# Patient Record
Sex: MALE | Race: WHITE | Employment: OTHER | URBAN - METROPOLITAN AREA
[De-identification: names, ages, dates, MRNs, and addresses within clinical notes are randomized per-mention and may not be internally consistent; named-entity substitution may affect disease eponyms.]

---

## 2018-03-11 DIAGNOSIS — I48.91 ATRIAL FIBRILLATION, UNSPECIFIED TYPE (HCC): ICD-10-CM

## 2018-04-11 ENCOUNTER — OFFICE VISIT (OUTPATIENT)
Dept: CARDIOLOGY CLINIC | Age: 63
End: 2018-04-11
Payer: MEDICARE

## 2018-04-11 VITALS
HEART RATE: 63 BPM | RESPIRATION RATE: 16 BRPM | BODY MASS INDEX: 33.21 KG/M2 | SYSTOLIC BLOOD PRESSURE: 118 MMHG | DIASTOLIC BLOOD PRESSURE: 80 MMHG | HEIGHT: 76 IN | WEIGHT: 272.7 LBS

## 2018-04-11 DIAGNOSIS — I48.91 ATRIAL FIBRILLATION, UNSPECIFIED TYPE (HCC): Primary | ICD-10-CM

## 2018-04-11 PROCEDURE — 1036F TOBACCO NON-USER: CPT | Performed by: INTERNAL MEDICINE

## 2018-04-11 PROCEDURE — 93000 ELECTROCARDIOGRAM COMPLETE: CPT | Performed by: INTERNAL MEDICINE

## 2018-04-11 PROCEDURE — 99214 OFFICE O/P EST MOD 30 MIN: CPT | Performed by: INTERNAL MEDICINE

## 2018-04-11 PROCEDURE — 3017F COLORECTAL CA SCREEN DOC REV: CPT | Performed by: INTERNAL MEDICINE

## 2018-04-11 PROCEDURE — G8598 ASA/ANTIPLAT THER USED: HCPCS | Performed by: INTERNAL MEDICINE

## 2018-04-11 PROCEDURE — G8427 DOCREV CUR MEDS BY ELIG CLIN: HCPCS | Performed by: INTERNAL MEDICINE

## 2018-04-11 PROCEDURE — G8417 CALC BMI ABV UP PARAM F/U: HCPCS | Performed by: INTERNAL MEDICINE

## 2018-05-18 LAB — INR BLD: 2.5

## 2018-05-21 ENCOUNTER — HOSPITAL ENCOUNTER (OUTPATIENT)
Dept: PHARMACY | Age: 63
Setting detail: THERAPIES SERIES
Discharge: HOME | End: 2018-05-21
Payer: MEDICARE

## 2018-05-21 DIAGNOSIS — I48.91 ATRIAL FIBRILLATION, UNSPECIFIED TYPE (HCC): ICD-10-CM

## 2018-06-18 ENCOUNTER — HOSPITAL ENCOUNTER (OUTPATIENT)
Dept: PHARMACY | Age: 63
Setting detail: THERAPIES SERIES
Discharge: HOME | End: 2018-06-18
Payer: MEDICARE

## 2018-06-18 VITALS
BODY MASS INDEX: 33.33 KG/M2 | WEIGHT: 273.8 LBS | DIASTOLIC BLOOD PRESSURE: 81 MMHG | HEART RATE: 63 BPM | SYSTOLIC BLOOD PRESSURE: 152 MMHG

## 2018-06-18 DIAGNOSIS — I48.91 ATRIAL FIBRILLATION, UNSPECIFIED TYPE (HCC): ICD-10-CM

## 2018-06-18 LAB — INTERNATIONAL NORMALIZATION RATIO, POC: 2.2

## 2018-06-18 PROCEDURE — 99211 OFF/OP EST MAY X REQ PHY/QHP: CPT

## 2018-06-18 PROCEDURE — 85610 PROTHROMBIN TIME: CPT

## 2018-06-18 RX ORDER — ERGOCALCIFEROL 1.25 MG/1
50000 CAPSULE ORAL WEEKLY
COMMUNITY
End: 2020-01-22

## 2018-07-16 ENCOUNTER — HOSPITAL ENCOUNTER (OUTPATIENT)
Dept: PHARMACY | Age: 63
Setting detail: THERAPIES SERIES
Discharge: HOME | End: 2018-07-16
Payer: MEDICARE

## 2018-07-16 DIAGNOSIS — I48.91 ATRIAL FIBRILLATION, UNSPECIFIED TYPE (HCC): ICD-10-CM

## 2018-07-16 LAB — INR BLD: 2.8

## 2018-08-01 ENCOUNTER — TELEPHONE (OUTPATIENT)
Dept: PHARMACY | Age: 63
End: 2018-08-01

## 2018-08-13 ENCOUNTER — HOSPITAL ENCOUNTER (OUTPATIENT)
Dept: PHARMACY | Age: 63
Setting detail: THERAPIES SERIES
Discharge: HOME | End: 2018-08-13
Payer: MEDICARE

## 2018-08-13 DIAGNOSIS — I48.91 ATRIAL FIBRILLATION, UNSPECIFIED TYPE (HCC): ICD-10-CM

## 2018-08-13 LAB — INR BLD: 2.7

## 2018-09-10 ENCOUNTER — HOSPITAL ENCOUNTER (OUTPATIENT)
Dept: PHARMACY | Age: 63
Setting detail: THERAPIES SERIES
Discharge: HOME | End: 2018-09-10
Payer: MEDICARE

## 2018-09-10 DIAGNOSIS — I48.91 ATRIAL FIBRILLATION, UNSPECIFIED TYPE (HCC): ICD-10-CM

## 2018-09-10 LAB — INR BLD: 3.3

## 2018-09-10 NOTE — PROGRESS NOTES
[address] [facility] Anticoagulation Clinic - SELF-TEST Encounter    Patient Findings     Positives:   Change in medications (OFF INVOKANA; NOW ON JARDIANCE)    Negatives:   Signs/symptoms of thrombosis, Signs/symptoms of bleeding, Laboratory test error suspected, Change in health, Change in alcohol use, Change in activity, Upcoming invasive procedure, Emergency department visit, Upcoming dental procedure, Missed doses, Extra doses, Change in diet/appetite, Hospital admission, Bruising, Other complaints         Patient  reports that he has never smoked. He has never used smokeless tobacco.     Assessment/Plan:  INR is SUPRAtherapeutic at 3.3, goal is 2-3. NO KNOWN CAUSE. Warfarin Dose: 5 MG TODAY THEN RESUME USUAL DOSE OF 15 MG ON 15 MG ON MON./WED. ; 10 MG ALL OTHER DAYS    Warfarin Dosing Orders Provided To: Patient    Self-Test Follow Up: 2 weeks on 9/24    [facility] Scheduled Visit: Due Dec. 2018 - not yet scheduled    Patient understands dosing directions and information discussed. Dosing schedule and follow up appointment given to patient. Patient acknowledges working in consult agreement with pharmacist as referred by his/her physician.     Andrew GarciaD 9/10/2018 1:39 PM

## 2018-09-24 ENCOUNTER — HOSPITAL ENCOUNTER (OUTPATIENT)
Dept: PHARMACY | Age: 63
Setting detail: THERAPIES SERIES
Discharge: HOME | End: 2018-09-24
Payer: MEDICARE

## 2018-09-24 DIAGNOSIS — I48.91 ATRIAL FIBRILLATION, UNSPECIFIED TYPE (HCC): ICD-10-CM

## 2018-09-24 LAB — INR BLD: 2.5

## 2018-10-22 ENCOUNTER — HOSPITAL ENCOUNTER (OUTPATIENT)
Dept: PHARMACY | Age: 63
Setting detail: THERAPIES SERIES
Discharge: HOME | End: 2018-10-22
Payer: MEDICARE

## 2018-10-22 DIAGNOSIS — I48.91 ATRIAL FIBRILLATION, UNSPECIFIED TYPE (HCC): ICD-10-CM

## 2018-10-22 LAB — INR BLD: 2.5

## 2018-11-20 ENCOUNTER — HOSPITAL ENCOUNTER (OUTPATIENT)
Dept: PHARMACY | Age: 63
Setting detail: THERAPIES SERIES
Discharge: HOME | End: 2018-11-20
Payer: MEDICARE

## 2018-11-20 DIAGNOSIS — I48.91 ATRIAL FIBRILLATION, UNSPECIFIED TYPE (HCC): ICD-10-CM

## 2018-11-20 LAB — INR BLD: 4.1

## 2018-11-20 NOTE — PROGRESS NOTES
[facility] - SELF-TEST Encounter    Patient Findings     Negatives:   Signs/symptoms of bleeding, Change in health, Change in alcohol use, Upcoming invasive procedure, Emergency department visit, Upcoming dental procedure, Missed doses, Extra doses, Change in medications, Change in diet/appetite, Hospital admission, Bruising         Patient  reports that he has never smoked. He has never used smokeless tobacco.     Assessment/Plan:  INR is SUPRAtherapeutic at 4.1, goal is 2-3. UNKNOWN CAUSE. Patient is on warfarin for atrial fibrillation      Warfarin Dose: HOLD DOSE TONIGHT - THEN RESUME NORMAL SCHEDULE (15mg on Mon/Wed, 10mg all other days)    Warfarin Dosing Orders Provided To: PATIENT    Self-Test Follow Up: TBD    Next [facility] Scheduled Visit: 12/5/18    Patient understands dosing directions and information discussed. Dosing schedule and follow up appointment given to patient. Patient acknowledges working in consult agreement with pharmacist as referred by his/her physician.     Mustapha Lundberg PharmD 11/20/2018 11:32 AM

## 2018-12-12 ENCOUNTER — HOSPITAL ENCOUNTER (OUTPATIENT)
Dept: PHARMACY | Age: 63
Setting detail: THERAPIES SERIES
Discharge: HOME | End: 2018-12-12
Payer: MEDICARE

## 2018-12-12 VITALS
WEIGHT: 277.8 LBS | SYSTOLIC BLOOD PRESSURE: 165 MMHG | BODY MASS INDEX: 33.81 KG/M2 | HEART RATE: 61 BPM | DIASTOLIC BLOOD PRESSURE: 94 MMHG

## 2018-12-12 DIAGNOSIS — I48.91 ATRIAL FIBRILLATION, UNSPECIFIED TYPE (HCC): ICD-10-CM

## 2018-12-12 LAB — INTERNATIONAL NORMALIZATION RATIO, POC: 3

## 2018-12-12 PROCEDURE — 99211 OFF/OP EST MAY X REQ PHY/QHP: CPT

## 2018-12-12 PROCEDURE — 85610 PROTHROMBIN TIME: CPT

## 2019-01-07 LAB — INR BLD: 2.7

## 2019-01-09 ENCOUNTER — HOSPITAL ENCOUNTER (OUTPATIENT)
Dept: PHARMACY | Age: 64
Setting detail: THERAPIES SERIES
Discharge: HOME | End: 2019-01-09
Payer: MEDICARE

## 2019-01-09 DIAGNOSIS — I48.91 ATRIAL FIBRILLATION, UNSPECIFIED TYPE (HCC): ICD-10-CM

## 2019-02-04 ENCOUNTER — HOSPITAL ENCOUNTER (OUTPATIENT)
Dept: PHARMACY | Age: 64
Setting detail: THERAPIES SERIES
Discharge: HOME | End: 2019-02-04
Payer: MEDICARE

## 2019-02-04 DIAGNOSIS — I48.91 ATRIAL FIBRILLATION, UNSPECIFIED TYPE (HCC): ICD-10-CM

## 2019-02-04 LAB — INR BLD: 2.6

## 2019-03-04 ENCOUNTER — HOSPITAL ENCOUNTER (OUTPATIENT)
Dept: PHARMACY | Age: 64
Setting detail: THERAPIES SERIES
Discharge: HOME | End: 2019-03-04
Payer: MEDICARE

## 2019-03-04 DIAGNOSIS — I48.91 ATRIAL FIBRILLATION, UNSPECIFIED TYPE (HCC): ICD-10-CM

## 2019-03-04 LAB — INR BLD: 2.2

## 2019-03-11 RX ORDER — WARFARIN SODIUM 10 MG/1
TABLET ORAL
Qty: 104 TABLET | Refills: 3 | OUTPATIENT
Start: 2019-03-11

## 2019-03-14 RX ORDER — WARFARIN SODIUM 10 MG/1
TABLET ORAL
Qty: 120 TABLET | Refills: 3 | Status: SHIPPED | OUTPATIENT
Start: 2019-03-14 | End: 2019-10-17 | Stop reason: SDUPTHER

## 2019-04-01 ENCOUNTER — HOSPITAL ENCOUNTER (OUTPATIENT)
Dept: PHARMACY | Age: 64
Setting detail: THERAPIES SERIES
Discharge: HOME | End: 2019-04-01
Payer: MEDICARE

## 2019-04-01 DIAGNOSIS — I48.91 ATRIAL FIBRILLATION, UNSPECIFIED TYPE (HCC): ICD-10-CM

## 2019-04-01 LAB — INR BLD: 2.9

## 2019-04-01 NOTE — PROGRESS NOTES
[id number] [facility] Anticoagulation Clinic - SELF-TEST Encounter    Patient Findings     Positives:   Change in medications (ACARBOSE INCREASED FROM 50 MG  MG TID; JARIANCE INCREASED FROM 10 MG TO 25 MG DAILY)    Negatives:   Signs/symptoms of thrombosis, Signs/symptoms of bleeding, Laboratory test error suspected, Change in health, Change in alcohol use, Change in activity, Upcoming invasive procedure, Emergency department visit, Upcoming dental procedure, Missed doses, Extra doses, Change in diet/appetite, Hospital admission, Bruising, Other complaints         Assessment/Plan:  INR is therapeutic at 2.9, goal is 2-3. Patient is on warfarin for atrial fibrillation. Warfarin Dosing Orders: same (15 mg every Mon./Wed.; 10 mg all other days)    Warfarin Dosing Orders Provided To: patient    Self-Test Follow Up: 4/29/19 - 4 weeks    Next [facility] Scheduled Visit: June (not scheduled yet)    Patient understands dosing directions and information discussed. Dosing schedule and follow up appointment given to patient. Patient acknowledges working in consult agreement with pharmacist as referred by his/her physician.     Emelia Amaya PharmD 4/1/2019 9:16 AM

## 2019-04-11 ENCOUNTER — OFFICE VISIT (OUTPATIENT)
Dept: CARDIOLOGY CLINIC | Age: 64
End: 2019-04-11
Payer: MEDICARE

## 2019-04-11 VITALS
HEIGHT: 76 IN | HEART RATE: 65 BPM | RESPIRATION RATE: 16 BRPM | WEIGHT: 290.4 LBS | BODY MASS INDEX: 35.36 KG/M2 | SYSTOLIC BLOOD PRESSURE: 122 MMHG | DIASTOLIC BLOOD PRESSURE: 78 MMHG

## 2019-04-11 DIAGNOSIS — I48.91 ATRIAL FIBRILLATION, UNSPECIFIED TYPE (HCC): Primary | ICD-10-CM

## 2019-04-11 PROCEDURE — G8427 DOCREV CUR MEDS BY ELIG CLIN: HCPCS | Performed by: INTERNAL MEDICINE

## 2019-04-11 PROCEDURE — 99214 OFFICE O/P EST MOD 30 MIN: CPT | Performed by: INTERNAL MEDICINE

## 2019-04-11 PROCEDURE — 93000 ELECTROCARDIOGRAM COMPLETE: CPT | Performed by: INTERNAL MEDICINE

## 2019-04-11 PROCEDURE — 1036F TOBACCO NON-USER: CPT | Performed by: INTERNAL MEDICINE

## 2019-04-11 PROCEDURE — G8598 ASA/ANTIPLAT THER USED: HCPCS | Performed by: INTERNAL MEDICINE

## 2019-04-11 PROCEDURE — G8417 CALC BMI ABV UP PARAM F/U: HCPCS | Performed by: INTERNAL MEDICINE

## 2019-04-11 PROCEDURE — 3017F COLORECTAL CA SCREEN DOC REV: CPT | Performed by: INTERNAL MEDICINE

## 2019-04-11 NOTE — PROGRESS NOTES
CHIEF COMPLAINT: Atrial fibrillation/CAD    HPI: Patient is a 62 y.o. male seen at the request of Eboni Estrada MD.      Patient with the past medical history significant for atrial fibrillation who also carries a history of hypertension, hyperlipidemia and diabetes as well as obstructive sleep apnea. Patient was initially evaluated in 2004 at [facility] in [address] at which time patient had an abnormal EKG for which he underwent cardiac catheterization that showed 50% right coronary artery as the only significant lesion. Patient was placed on propafenone and underwent electrophysiology study, at which time he states they did an atrial flutter ablation. Records of this particular procedure are not available at this juncture. He states he has been continued on his warfarin and metoprolol since. Patient noted to have ongoing afib issues. Seen by EP here. Failed Tikosyn. Asymptomatic so observation recommended. Sought second opinion by EP in [facility]. Noted to be in sinus and was sinus for duration of a 2 week monitor. Observation was recommended. In atrial flutter. Patient denies chest pain or GELLER. Denies any edema or symptoms of heart failure. Asymptomatic.     Past Medical History:   Diagnosis Date    Atrial fibrillation (HCC)     CURRENT, CHRONIC A FIB, ON ANTICOAGULATON    Atrial flutter ([address]     CAD (coronary artery disease)     STATES SLIGHT BLOCKAGE, FOLLOWS WITH DR. Grace Rowland, DCA,,STRESS TEST DONE 12/20/13, SEE EPIC    Degenerative disc disease     CERVICAL    Degenerative disc disease     CERVICAL    Diabetes mellitus ([address]     STEADY HIGH RANGE, STATES HAS TO WORK ON WEIGHT LOSS    [place]    H/O cardiovascular stress test 12/20/13    SEE EPIC    Hyperlipidemia     Hypertension     STATES STABLE    Obstructive sleep apnea     S/P ablation of atrial fibrillation 2003       Patient Active Problem List   Diagnosis    Atrial fibrillation (HCC)    CAD (coronary artery disease)    Diabetes mellitus ([facility] [address]    Hypertension    Atrial flutter (HCC)    Obstructive sleep apnea       No Known Allergies    Current Outpatient Medications   Medication Sig Dispense Refill    warfarin (COUMADIN) 10 MG tablet Take 1 or 1 and a half tablets daily as directed by Anticoagulation Clinic 120 tablet 3    empagliflozin (JARDIANCE) 25 MG tablet Take 25 mg by mouth daily       vitamin D (ERGOCALCIFEROL) 05774 units CAPS capsule Take 50,000 Units by mouth once a week      metFORMIN (GLUCOPHAGE) 500 MG tablet Take 1,000 mg by mouth 2 times daily (with meals)       glipiZIDE (GLUCOTROL) 5 MG tablet Take 10 mg by mouth 2 times daily (before meals)       losartan (COZAAR) 100 MG tablet Take 1 tablet by mouth daily 30 tablet 11    metoprolol (TOPROL-XL) 50 MG XL tablet take 1 tablet by mouth once daily 90 tablet 3    atorvastatin (LIPITOR) 40 MG tablet Take 40 mg by mouth nightly.  acarbose (PRECOSE) 100 MG tablet Take 100 mg by mouth 3 times daily (with meals)       LORazepam (ATIVAN) 0.5 MG tablet Take 0.5 mg by mouth every 6 hours as needed for Anxiety.  doxazosin (CARDURA) 4 MG tablet Take 2 mg by mouth nightly. No current facility-administered medications for this visit.         Social History     Socioeconomic History    Marital status:      Spouse name: Not on file    Number of children: Not on file    Years of education: Not on file    Highest education level: Not on file   Occupational History    Not on file   Social Needs    Financial resource strain: Not on file    Food insecurity:     Worry: Not on file     Inability: Not on file    Transportation needs:     Medical: Not on file     Non-medical: Not on file   Tobacco Use    Smoking status: Never Smoker    Smokeless tobacco: Never Used   Substance and Sexual Activity    Alcohol use: No    Drug use: No    Sexual activity: Not on file   Lifestyle    Physical activity:     Days per week: Not on file     Minutes per session: Not on file    Stress: Not on file   Relationships    Social connections:     Talks on phone: Not on file     Gets together: Not on file     Attends Zoroastrianism service: Not on file     Active member of club or organization: Not on file     Attends meetings of clubs or organizations: Not on file     Relationship status: Not on file    Intimate partner violence:     Fear of current or ex partner: Not on file     Emotionally abused: Not on file     Physically abused: Not on file     Forced sexual activity: Not on file   Other Topics Concern    Not on file   Social History Narrative    Not on file       No family history on file. Review of Systems:  Heart: as above   Lungs: as above   Eyes: denies changes in vision or discharge. Ears: denies changes in hearing or pain. Nose: denies epistaxis or masses   Throat: denies sore throat or trouble swallowing. Neuro: denies numbness, tingling, tremors. Skin: denies rashes or itching. : denies hematuria, dysuria   GI: denies vomiting, diarrhea   Psych: denies mood changed, anxiety, depression. Physical Exam   /78   Pulse 65   Resp 16   Ht 6' 4\" (1.93 m)   Wt 290 lb 6.4 oz (131.7 kg)   BMI 35.35 kg/m²   Constitutional: Oriented to person, place, and time. Well-developed and well-nourished. No distress. Head: Normocephalic and atraumatic. Eyes: EOM are normal. Pupils are equal, round, and reactive to light. Neck: Normal range of motion. Neck supple. No hepatojugular reflux and no JVD present. Carotid bruit is not present. No tracheal deviation present. No thyromegaly present. Cardiovascular: Normal rate, regular rhythm, normal heart sounds and intact distal pulses. Exam reveals no gallop and no friction rub. No murmur heard. Pulmonary/Chest: Effort normal and breath sounds normal. No respiratory distress. No wheezes. No rales. No tenderness. Abdominal: Soft. Bowel sounds are normal. No distension and no mass. No tenderness. No rebound and no guarding. Musculoskeletal: Normal range of motion. No edema and no tenderness. Lymphadenopathy:   No cervical adenopathy. No groin adenopathy. Neurological: Alert and oriented to person, place, and time. Skin: Skin is warm and dry. No rash noted. Not diaphoretic. No erythema. Psychiatric: Normal mood and affect. Behavior is normal.     EKG: Atrial flutter, nonspecific ST and T waves changes. ASSESSMENT AND PLAN:  Patient Active Problem List   Diagnosis    Atrial fibrillation [facility]    CAD (coronary artery disease)    Diabetes mellitus ([facility]    Hypertension    Atrial flutter (HCC)    Obstructive sleep apnea     1. Atrial Flutter/Fib: In atrial flutter, rate controlled. Patient has history of atrial flutter ablation. Patient asymptomatic. Treat as chronic. Warfarin for stroke prevention. 2. CAD: Patient with a history of cardiac catheterization in 2004 at which time he had a 50% lesion of the RCA noted. Patient has multiple risk factors. Stable stress 12/13. Continue BB and statin. No ASA due to warfarin. 3. HTN: Observe. 4. Lipids: Statin. 5. DM: Per PCP. Lauren Ibanez D.O.   Cardiologist  Cardiology, [address]

## 2019-04-22 LAB — INR BLD: 2.6

## 2019-04-24 ENCOUNTER — HOSPITAL ENCOUNTER (OUTPATIENT)
Dept: PHARMACY | Age: 64
Setting detail: THERAPIES SERIES
Discharge: HOME | End: 2019-04-24
Payer: MEDICARE

## 2019-04-24 DIAGNOSIS — I48.91 ATRIAL FIBRILLATION, UNSPECIFIED TYPE (HCC): ICD-10-CM

## 2019-04-24 NOTE — PROGRESS NOTES
[facility] Anticoagulation Clinic - SELF-TEST Encounter       Assessment/Plan:  INR is therapeutic at 2.6, goal is 2-3. Patient is on warfarin for atrial fibrillation. Warfarin Dosing Orders: same (15 mg every Mon., Wed.; 10 mg all other days)    Warfarin Dosing Orders Provided To: left on patient's home phone     Self-Test Follow Up: 4 weeks - May 20    Next [address] [place] [address] Scheduled Visit: due June 2019    Patient understands dosing directions and information discussed. Dosing schedule and follow up appointment given to patient. Patient acknowledges working in consult agreement with pharmacist as referred by his/her physician.     Lobito Irizarry PharmD 4/24/2019 2:36 PM

## 2019-05-20 ENCOUNTER — HOSPITAL ENCOUNTER (OUTPATIENT)
Dept: PHARMACY | Age: 64
Setting detail: THERAPIES SERIES
Discharge: HOME | End: 2019-05-20
Payer: MEDICARE

## 2019-05-20 DIAGNOSIS — I48.91 ATRIAL FIBRILLATION, UNSPECIFIED TYPE (HCC): ICD-10-CM

## 2019-05-20 LAB — INR BLD: 3.8

## 2019-05-20 NOTE — PROGRESS NOTES
[id number] [facility] Anticoagulation Clinic - SELF-TEST Encounter    Patient Findings     Negatives:   Signs/symptoms of thrombosis, Signs/symptoms of bleeding, Laboratory test error suspected, Change in health, Change in alcohol use, Change in activity, Upcoming invasive procedure, Emergency department visit, Upcoming dental procedure, Missed doses, Extra doses, Change in medications, Change in diet/appetite, Hospital admission, Bruising, Other complaints         Assessment/Plan:  INR is SUPRAtherapeutic at 3.8, goal is 2-3. NO KNOWN CAUSE. Patient is on warfarin for atrial fibrillation. Warfarin Dosing Orders: HOLD TODAY THEN RESUME USUAL DOSE OF 15 MG EVERY MON./WED. ; 10 MG ALL OTHER DAYS STARTING TOMORROW    Warfarin Dosing Orders Provided To: patient     Self-Test Follow Up: 5/31    [facility] Scheduled Visit: due in June (every 6 months)    Patient understands dosing directions and information discussed. Dosing schedule and follow up appointment given to patient. Patient acknowledges working in consult agreement with pharmacist as referred by his/her physician.     Vernell Montalvo PharmD 5/20/2019 11:31 AM

## 2019-05-31 LAB — INR BLD: 1.4

## 2019-06-03 ENCOUNTER — HOSPITAL ENCOUNTER (OUTPATIENT)
Dept: PHARMACY | Age: 64
Discharge: HOME | End: 2019-06-03

## 2019-06-03 DIAGNOSIS — I48.91 ATRIAL FIBRILLATION, UNSPECIFIED TYPE (HCC): ICD-10-CM

## 2019-06-07 ENCOUNTER — HOSPITAL ENCOUNTER (OUTPATIENT)
Dept: PHARMACY | Age: 64
Setting detail: THERAPIES SERIES
Discharge: HOME | End: 2019-06-07
Payer: MEDICARE

## 2019-06-07 DIAGNOSIS — I48.91 ATRIAL FIBRILLATION, UNSPECIFIED TYPE (HCC): ICD-10-CM

## 2019-06-07 LAB — INR BLD: 2.6

## 2019-06-17 ENCOUNTER — HOSPITAL ENCOUNTER (OUTPATIENT)
Dept: PHARMACY | Age: 64
Setting detail: THERAPIES SERIES
Discharge: HOME | End: 2019-06-17
Payer: MEDICARE

## 2019-06-17 DIAGNOSIS — I48.91 ATRIAL FIBRILLATION, UNSPECIFIED TYPE (HCC): ICD-10-CM

## 2019-06-17 LAB — INR BLD: 2.5

## 2019-06-17 NOTE — PROGRESS NOTES
[address] - SELF-TEST Encounter    Patient Findings     Positives:   Change in health (PATIENT WEARS COMPRESSION STOCKING FOR VENOSTASIS)    Negatives:   Signs/symptoms of thrombosis, Signs/symptoms of bleeding, Laboratory test error suspected, Change in alcohol use, Change in activity, Upcoming invasive procedure, Emergency department visit, Upcoming dental procedure, Missed doses, Extra doses, Change in medications, Change in diet/appetite, Hospital admission, Bruising, Other complaints    Comments:   APPT WITH PODIATRIST ON 6/24/19           Assessment/Plan:  INR is therapeutic at 2.5, goal is 2-3  Patient is on warfarin for atrial fibrillation      Warfarin Dosing Orders: 15 mg every Mon, Wed; 10 mg all other days    Warfarin Dosing Orders Provided To: patient    Self-Test Follow Up: Next testing will be at [facility] clinic on 7/1/19    [address] Scheduled Visit: 7/1/19    Patient understands dosing directions and information discussed. Dosing schedule and follow up appointment given to patient. Patient acknowledges working in consult agreement with pharmacist as referred by his/her physician.     Matt Pizano PharmD 6/17/2019 3:06 PM

## 2019-07-01 ENCOUNTER — HOSPITAL ENCOUNTER (OUTPATIENT)
Dept: PHARMACY | Age: 64
Setting detail: THERAPIES SERIES
Discharge: HOME | End: 2019-07-01
Payer: MEDICARE

## 2019-07-01 VITALS
SYSTOLIC BLOOD PRESSURE: 174 MMHG | WEIGHT: 281 LBS | HEART RATE: 62 BPM | BODY MASS INDEX: 34.2 KG/M2 | DIASTOLIC BLOOD PRESSURE: 93 MMHG

## 2019-07-01 DIAGNOSIS — I48.91 ATRIAL FIBRILLATION, UNSPECIFIED TYPE (HCC): ICD-10-CM

## 2019-07-01 LAB — INTERNATIONAL NORMALIZATION RATIO, POC: 2.5

## 2019-07-01 PROCEDURE — 85610 PROTHROMBIN TIME: CPT

## 2019-07-01 PROCEDURE — 99211 OFF/OP EST MAY X REQ PHY/QHP: CPT

## 2019-07-03 ENCOUNTER — TELEPHONE (OUTPATIENT)
Dept: PHARMACY | Age: 64
End: 2019-07-03

## 2019-07-03 ENCOUNTER — OFFICE VISIT (OUTPATIENT)
Dept: SURGERY | Age: 64
End: 2019-07-03
Payer: MEDICARE

## 2019-07-03 VITALS
DIASTOLIC BLOOD PRESSURE: 80 MMHG | HEIGHT: 76 IN | OXYGEN SATURATION: 97 % | RESPIRATION RATE: 16 BRPM | HEART RATE: 70 BPM | WEIGHT: 274 LBS | TEMPERATURE: 98.1 F | SYSTOLIC BLOOD PRESSURE: 142 MMHG | BODY MASS INDEX: 33.36 KG/M2

## 2019-07-03 DIAGNOSIS — K64.5 EXTERNAL THROMBOSED HEMORRHOIDS: Primary | ICD-10-CM

## 2019-07-03 PROCEDURE — 99203 OFFICE O/P NEW LOW 30 MIN: CPT | Performed by: SURGERY

## 2019-07-03 PROCEDURE — 46083 INC THROMBOSED HROID XTRNL: CPT | Performed by: SURGERY

## 2019-07-03 NOTE — PROGRESS NOTES
Patient's Name/Date of Birth: Raisa Dacosta / 1955    Date: 7/3/2019    PCP: Denisha Serra MD    Chief Complaint   Patient presents with    Hemorrhoids     pt. complains of bleeding hemorrhoids since Friday       HPI:  Patient seen for evaluation of rectal bleeding and rectal pain for 5 days duration. No recent change in bowel habits    Patient's medications, allergies, past medical, surgical, social and family histories were reviewed and updated as appropriate.     No Known Allergies    Past Medical History:   Diagnosis Date    Atrial fibrillation ([address]     CURRENT, CHRONIC A FIB, ON ANTICOAGULATON    Atrial flutter ([address]     CAD (coronary artery disease)     STATES SLIGHT BLOCKAGE, FOLLOWS WITH DR. Chasidy Slaughter, DCA,,STRESS TEST DONE 12/20/13, SEE EPIC    Degenerative disc disease     CERVICAL    Degenerative disc disease     CERVICAL    Diabetes mellitus ([address]     STEADY HIGH RANGE, STATES HAS TO WORK ON WEIGHT LOSS    [place] cardiac [place]    H/O cardiovascular stress test 12/20/13    SEE EPIC    Hyperlipidemia     Hypertension     STATES STABLE    Obstructive sleep apnea     S/P ablation of atrial fibrillation 2003        Past Surgical History:   Procedure Laterality Date    BACK SURGERY      CARDIOVERSION  04/08/2014    Dr Rehana Guajardo 200 joules x 2    CERVICAL FUSION  2011    [facility]    COLONOSCOPY  1/6/2014    OTHER SURGICAL HISTORY  2004    Electrophysiology study    PILONIDAL CYST EXCISION  IN HIGH SCHOOL    TRANSESOPHAGEAL ECHOCARDIOGRAM  04/08/2014    Dr Bray [facility] History     Tobacco Use    Smoking status: Never Smoker    Smokeless tobacco: Never Used   Substance Use Topics    Alcohol use: No       Current Outpatient Medications   Medication Sig Dispense Refill    warfarin (COUMADIN) 10 MG tablet Take 1 or 1 and a half tablets daily as directed by Anticoagulation Clinic 120 tablet 3    empagliflozin (JARDIANCE) 25 MG tablet Take 25 mg by mouth daily  metFORMIN (GLUCOPHAGE) 500 MG tablet Take 1,000 mg by mouth 2 times daily (with meals)       glipiZIDE (GLUCOTROL) 5 MG tablet Take 10 mg by mouth 2 times daily (before meals)       losartan (COZAAR) 100 MG tablet Take 1 tablet by mouth daily 30 tablet 11    metoprolol (TOPROL-XL) 50 MG XL tablet take 1 tablet by mouth once daily 90 tablet 3    atorvastatin (LIPITOR) 40 MG tablet Take 40 mg by mouth nightly.  acarbose (PRECOSE) 100 MG tablet Take 100 mg by mouth 3 times daily (with meals)       LORazepam (ATIVAN) 0.5 MG tablet Take 0.5 mg by mouth every 6 hours as needed for Anxiety.  doxazosin (CARDURA) 4 MG tablet Take 2 mg by mouth nightly.  vitamin D (ERGOCALCIFEROL) 00861 units CAPS capsule Take 50,000 Units by mouth once a week       No current facility-administered medications for this visit. Review of Systems  Constitutional: negative  Eyes: negative  Ears, nose, mouth, throat, and face: negative  Respiratory: negative  Cardiovascular: negative  Gastrointestinal: negative  Genitourinary:negative  Integument/breast: negative  Hematologic/lymphatic: negative  Musculoskeletal:negative  Neurological: negative  Allergic/Immunologic: negative    Physical exam:  BP (!) 142/80   Pulse 70   Temp 98.1 °F (36.7 °C) (Oral)   Resp 16   Ht 6' 4\" (1.93 m)   Wt 274 lb (124.3 kg)   SpO2 97%   BMI 33.35 kg/m²   General appearance: no acute distress  Head:NCAT, EOMI, PERRLA, conjunctiva pink  Neck: no masses, supple  Lungs: CTABL  Heart: RRR  Abdomen: soft, nondistended, nontender, no guarding, no peritoneal signs, normoactive bowel sounds  Extremities:no edema  Rectal exam: acutely thrombosed external hemorrhoid  Skin: no lesions, no rashes    Assessment/Plan:  . Incision and Drainage Procedure Note    Indication: thrombosed external hemorrhoids    Procedure:  The patient was positioned appropriately and the skin over the incision site was prepped with betadine and draped in a sterile

## 2019-07-29 ENCOUNTER — HOSPITAL ENCOUNTER (OUTPATIENT)
Dept: PHARMACY | Age: 64
Setting detail: THERAPIES SERIES
Discharge: HOME | End: 2019-07-29
Payer: MEDICARE

## 2019-07-29 DIAGNOSIS — I48.91 ATRIAL FIBRILLATION, UNSPECIFIED TYPE (HCC): ICD-10-CM

## 2019-07-29 LAB — INR BLD: 2.7

## 2019-08-26 ENCOUNTER — HOSPITAL ENCOUNTER (OUTPATIENT)
Dept: PHARMACY | Age: 64
Setting detail: THERAPIES SERIES
Discharge: HOME | End: 2019-08-26
Payer: MEDICARE

## 2019-08-26 DIAGNOSIS — I48.91 ATRIAL FIBRILLATION, UNSPECIFIED TYPE (HCC): ICD-10-CM

## 2019-08-26 LAB — INR BLD: 2.1

## 2019-09-23 ENCOUNTER — HOSPITAL ENCOUNTER (OUTPATIENT)
Dept: PHARMACY | Age: 64
Setting detail: THERAPIES SERIES
Discharge: HOME | End: 2019-09-23
Payer: MEDICARE

## 2019-09-23 DIAGNOSIS — I48.91 ATRIAL FIBRILLATION, UNSPECIFIED TYPE (HCC): ICD-10-CM

## 2019-09-23 LAB — INR BLD: 2.4

## 2019-10-17 RX ORDER — WARFARIN SODIUM 10 MG/1
TABLET ORAL
Qty: 120 TABLET | Refills: 3 | Status: SHIPPED | OUTPATIENT
Start: 2019-10-17 | End: 2020-07-07

## 2019-10-21 ENCOUNTER — HOSPITAL ENCOUNTER (OUTPATIENT)
Dept: PHARMACY | Age: 64
Discharge: HOME | End: 2019-10-21

## 2019-10-21 DIAGNOSIS — I48.91 ATRIAL FIBRILLATION, UNSPECIFIED TYPE (HCC): ICD-10-CM

## 2019-10-21 LAB — INR BLD: 2.3

## 2019-10-23 ENCOUNTER — TELEPHONE (OUTPATIENT)
Dept: SURGERY | Age: 64
End: 2019-10-23

## 2019-11-25 ENCOUNTER — HOSPITAL ENCOUNTER (OUTPATIENT)
Dept: PHARMACY | Age: 64
Setting detail: THERAPIES SERIES
Discharge: HOME | End: 2019-11-25
Payer: MEDICARE

## 2019-11-25 DIAGNOSIS — I48.91 ATRIAL FIBRILLATION, UNSPECIFIED TYPE (HCC): ICD-10-CM

## 2019-11-25 LAB — INR BLD: 3.4

## 2019-12-09 ENCOUNTER — HOSPITAL ENCOUNTER (OUTPATIENT)
Dept: PHARMACY | Age: 64
Setting detail: THERAPIES SERIES
Discharge: HOME | End: 2019-12-09
Payer: MEDICARE

## 2019-12-09 DIAGNOSIS — I48.91 ATRIAL FIBRILLATION, UNSPECIFIED TYPE (HCC): ICD-10-CM

## 2019-12-09 LAB — INR BLD: 2.5

## 2020-01-06 ENCOUNTER — HOSPITAL ENCOUNTER (OUTPATIENT)
Dept: PHARMACY | Age: 65
Setting detail: THERAPIES SERIES
Discharge: HOME | End: 2020-01-06
Payer: MEDICARE

## 2020-01-06 LAB — INR BLD: 3.1

## 2020-01-22 ENCOUNTER — HOSPITAL ENCOUNTER (OUTPATIENT)
Dept: PHARMACY | Age: 65
Setting detail: THERAPIES SERIES
Discharge: HOME | End: 2020-01-22
Payer: MEDICARE

## 2020-01-22 VITALS
HEART RATE: 60 BPM | DIASTOLIC BLOOD PRESSURE: 84 MMHG | WEIGHT: 274.8 LBS | BODY MASS INDEX: 33.45 KG/M2 | SYSTOLIC BLOOD PRESSURE: 157 MMHG

## 2020-01-22 LAB — INTERNATIONAL NORMALIZATION RATIO, POC: 2.9

## 2020-01-22 PROCEDURE — 99211 OFF/OP EST MAY X REQ PHY/QHP: CPT

## 2020-01-22 PROCEDURE — 85610 PROTHROMBIN TIME: CPT

## 2020-01-22 NOTE — PROGRESS NOTES
[id number] [facility] Anticoagulation Clinic    Patient Findings     Positives:   Change in medications (MED REC COMPLETED)    Negatives:   Signs/symptoms of thrombosis, Signs/symptoms of bleeding, Laboratory test error suspected, Change in health, Change in alcohol use, Change in activity, Upcoming invasive procedure, Emergency department visit, Upcoming dental procedure, Missed doses, Extra doses, Change in diet/appetite, Hospital admission, Bruising, Other complaints    Comments:   [facility] IN April  [facility]Fl[facility]. YOMI APPTS. TO BE MADE SOON         Patient  reports that he has never smoked. He has never used smokeless tobacco.     Assessment/Plan:  Warfarin indication: atrial fibrillation      INR today is therapeutic at 2.9, goal is 2-3. LAST 3 INRS WERE 3.1, 2.5, AND 3.4. Warfarin Dose: DECREASE WEEKLY DOSE 6% TO 15 MG EVERY MON.; 10 MG ALL OTHER DAYS    Follow Up: 2 weeks - WILL SELF-TEST AT HOME    Patient understands dosing directions and information discussed. Dosing schedule and follow up appointment given to patient. Patient acknowledges working in consult agreement with pharmacist as referred by his/her physician.     Stanton Cruz PharmD 1/22/2020 1:15 PM

## 2020-02-05 ENCOUNTER — HOSPITAL ENCOUNTER (OUTPATIENT)
Dept: PHARMACY | Age: 65
Setting detail: THERAPIES SERIES
Discharge: HOME | End: 2020-02-05
Payer: MEDICARE

## 2020-02-05 LAB — INR BLD: 2.4

## 2020-02-05 NOTE — PROGRESS NOTES
[id number] [facility] Anticoagulation Clinic - SELF-TEST Encounter    Patient Findings     Negatives:   Signs/symptoms of thrombosis, Signs/symptoms of bleeding, Laboratory test error suspected, Change in health, Change in alcohol use, Change in activity, Upcoming invasive procedure, Emergency department visit, Upcoming dental procedure, Missed doses, Extra doses, Change in medications, Change in diet/appetite, Hospital admission, Bruising, Other complaints    Comments:   [place] 27TH TO [place]           Assessment/Plan:  INR is therapeutic at 2.4, goal is 2-3. Patient is on warfarin for atrial fibrillation. Warfarin Dosing Orders: same (15 mg every Mon.; 10 mg all other days)    Warfarin Dosing Orders Provided To: patient     Self-Test Follow Up: 2/26/2020    Next [facility] [place] Scheduled Visit: due in July    Patient understands dosing directions and information discussed. Dosing schedule and follow up appointment given to patient. Patient acknowledges working in consult agreement with pharmacist as referred by his/her physician.     River Huffman PharmD 2/5/2020 10:55 AM

## 2020-02-27 ENCOUNTER — HOSPITAL ENCOUNTER (OUTPATIENT)
Dept: PHARMACY | Age: 65
Setting detail: THERAPIES SERIES
Discharge: HOME | End: 2020-02-27
Payer: MEDICARE

## 2020-02-27 LAB — INR BLD: 2.3

## 2020-03-25 ENCOUNTER — HOSPITAL ENCOUNTER (OUTPATIENT)
Dept: PHARMACY | Age: 65
Setting detail: THERAPIES SERIES
Discharge: HOME | End: 2020-03-25
Payer: MEDICARE

## 2020-03-25 LAB — INR BLD: 3.6

## 2020-03-25 PROCEDURE — 99211 OFF/OP EST MAY X REQ PHY/QHP: CPT

## 2020-03-25 NOTE — PROGRESS NOTES
[id number] [facility] Anticoagulation Clinic - SELF-TEST Encounter    Patient Findings     Negatives:   Signs/symptoms of thrombosis, Signs/symptoms of bleeding, Laboratory test error suspected, Change in health, Change in alcohol use, Change in activity, Upcoming invasive procedure, Emergency department visit, Upcoming dental procedure, Missed doses, Extra doses, Change in medications, Change in diet/appetite, Hospital admission, Bruising, Other complaints           Assessment/Plan:  INR is SUPRAtherapeutic at 3.6, goal is 2-3. Patient is on warfarin for atrial fibrillation. Warfarin Dosing Orders: HOLD TODAY THE RESUME USUAL DOSE OF 15 MG EVERY MON.; 10 MG ALL OTHER DAYS    Warfarin Dosing Orders Provided To: patient    Self-Test Follow Up: 2 weeks    Next [facility] Scheduled Visit: due in July    Patient understands dosing directions and information discussed. Dosing schedule and follow up appointment given to patient. Patient acknowledges working in consult agreement with pharmacist as referred by his/her physician.     Kamala GarciaD 3/25/2020 4:55 PM

## 2020-04-08 ENCOUNTER — HOSPITAL ENCOUNTER (OUTPATIENT)
Dept: PHARMACY | Age: 65
Setting detail: THERAPIES SERIES
Discharge: HOME | End: 2020-04-08
Payer: MEDICARE

## 2020-04-08 LAB — INR BLD: 2.3

## 2020-04-08 PROCEDURE — 99211 OFF/OP EST MAY X REQ PHY/QHP: CPT

## 2020-04-08 NOTE — PROGRESS NOTES
[address] [facility] Anticoagulation Clinic - SELF-TEST Encounter    Patient Findings     Negatives:   Signs/symptoms of thrombosis, Signs/symptoms of bleeding, Laboratory test error suspected, Change in health, Change in alcohol use, Change in activity, Upcoming invasive procedure, Emergency department visit, Upcoming dental procedure, Missed doses, Extra doses, Change in medications, Change in diet/appetite, Hospital admission, Bruising, Other complaints           Assessment/Plan:  INR is therapeutic at 2.3, goal is 2-3. Patient is on warfarin for atrial fibrillation. Warfarin Dosing Orders: same (15 mg every Mon.; 10 mg all other days)    Warfarin Dosing Orders Provided To: patient    Self-Test Follow Up: 4 weeks - 5/6    Next [facility] Scheduled Visit: due in July    Patient understands dosing directions and information discussed. Dosing schedule and follow up appointment given to patient. Patient acknowledges working in consult agreement with pharmacist as referred by his/her physician.     Maria Isabel Le PharmD 4/8/2020 9:24 AM

## 2020-05-06 ENCOUNTER — HOSPITAL ENCOUNTER (OUTPATIENT)
Dept: PHARMACY | Age: 65
Setting detail: THERAPIES SERIES
Discharge: HOME | End: 2020-05-06
Payer: MEDICARE

## 2020-05-06 LAB — INR BLD: 1.7

## 2020-05-06 NOTE — PROGRESS NOTES
[id number] [facility] - SELF-TEST Encounter    Patient Findings     Negatives:   Signs/symptoms of thrombosis, Signs/symptoms of bleeding, Laboratory test error suspected, Change in health, Change in alcohol use, Change in activity, Upcoming invasive procedure, Emergency department visit, Upcoming dental procedure, Missed doses, Extra doses, Change in medications, Change in diet/appetite, Hospital admission, Bruising, Other complaints    Comments:   PCP VISIT SCHEDULED THIS MONTH           Assessment/Plan:  INR is SUBtherapeutic at 1.7, goal is 2-3. NO KNOWN CAUSE FOR LOW READING. Patient is on warfarin for atrial fibrillation. Warfarin Dosing Orders: 15 MG TODAY THEN RESUME PREVIOUS DOSE OF 15 MG EVERY MON.; 10 MG ALL OTHER DAYS    Warfarin Dosing Orders Provided To: patient    Self-Test Follow Up: 2 weeks at home    Next [address] [place] [address] Scheduled Visit: HERNAN d/t COVID    Patient understands dosing directions and information discussed. Dosing schedule and follow up appointment given to patient. Patient acknowledges working in consult agreement with pharmacist as referred by his/her physician. Emilee Castorena PharmD 5/6/2020 11:40 AM     CLINICAL PHARMACY CONSULT: MED RECONCILIATION/REVIEW ADDENDUM    For Pharmacy Admin Tracking Only    PHSO: No  Total # of Interventions Recommended: 1  - Increased Dose #: 1  - Maintenance Safety Lab Monitoring #: 1  Recommended intervention potential cost savings:   Accepted intervention potential cost savings:    Total Interventions Accepted: 1  Time Spent (min): 323 [address], PharmD

## 2020-05-21 ENCOUNTER — HOSPITAL ENCOUNTER (OUTPATIENT)
Dept: PHARMACY | Age: 65
Setting detail: THERAPIES SERIES
Discharge: HOME | End: 2020-05-21
Payer: MEDICARE

## 2020-05-21 LAB — INR BLD: 2

## 2020-05-21 PROCEDURE — 99211 OFF/OP EST MAY X REQ PHY/QHP: CPT

## 2020-05-21 NOTE — PROGRESS NOTES
[address] Anticoagulation Clinic - SELF-TEST Encounter    Patient Findings     Negatives:   Signs/symptoms of thrombosis, Signs/symptoms of bleeding, Laboratory test error suspected, Change in health, Change in alcohol use, Change in activity, Upcoming invasive procedure, Emergency department visit, Upcoming dental procedure, Missed doses, Extra doses, Change in medications, Change in diet/appetite, Hospital admission, Bruising, Other complaints           Assessment/Plan:  INR is therapeutic at , goal is 2-3. Patient is on warfarin for atrial fibrillation. Warfarin Dosing Orders: 15 MG TODAY THEN RESUME 15 MG EVERY MON.; 10 MG ALL OTHER DAYS    Warfarin Dosing Orders Provided To: patient    Self-Test Follow Up: 2 weeks on June 4th    Next [address] [place] [address] Scheduled Visit: due in July but will re-evaluate d/t COVID-19    Patient understands dosing directions and information discussed. Dosing schedule and follow up appointment given to patient. Patient acknowledges working in consult agreement with pharmacist as referred by his/her physician. Yary Gonzalez PharmD 5/21/2020 6:14 PM     CLINICAL PHARMACY CONSULT: MED RECONCILIATION/REVIEW ADDENDUM    For Pharmacy Admin Tracking Only    PHSO: No  Total # of Interventions Recommended: 1  - Increased Dose #: 1  - Maintenance Safety Lab Monitoring #: 1  Recommended intervention potential cost savings:   Accepted intervention potential cost savings:    Total Interventions Accepted: 1  Time Spent (min): 323 [address], PharmD

## 2020-06-04 ENCOUNTER — HOSPITAL ENCOUNTER (OUTPATIENT)
Dept: PHARMACY | Age: 65
Setting detail: THERAPIES SERIES
Discharge: HOME | End: 2020-06-04
Payer: MEDICARE

## 2020-06-04 LAB — INR BLD: 1.9

## 2020-06-04 NOTE — PROGRESS NOTES
[address] - SELF-TEST Encounter     <<< VOICEMAIL >>>       Assessment/Plan:  INR is SUBtherapeutic at 1.9, goal is 2-3. Patient is on warfarin for atrial fibrillation.       Warfarin Dosing Orders: INCREASE WEEKLY DOSE BY 7% TO 15 MG EVERY MON./THUR.; 10 MG ALL OTHER DAYS     Warfarin Dosing Orders Provided To: LEFT MAYLIN KELLIE ON HOUSE PHONE     Self-Test Follow Up: 2 weeks on June 18th     [address] Scheduled Visit: due in July but will re-evaluate d/t COVID-19     Patient understands dosing directions and information discussed. Dosing schedule and follow up appointment given to patient.  Patient acknowledges working in consult agreement with pharmacist as referred by his/her physician.     Payton Campoverde, PharmD 6/4/2020 11:35 AM    CLINICAL PHARMACY CONSULT: MED RECONCILIATION/REVIEW ADDENDUM    For Pharmacy Admin Tracking Only    PHSO: No  Total # of Interventions Recommended: 1  - Increased Dose #: 1  - Maintenance Safety Lab Monitoring #: 1  Recommended intervention potential cost savings:   Accepted intervention potential cost savings:    Total Interventions Accepted: 1  Time Spent (min): 323 [address], PharmD

## 2020-06-18 ENCOUNTER — HOSPITAL ENCOUNTER (OUTPATIENT)
Dept: PHARMACY | Age: 65
Setting detail: THERAPIES SERIES
Discharge: HOME | End: 2020-06-18
Payer: MEDICARE

## 2020-06-18 LAB — INR BLD: 3.3

## 2020-06-18 PROCEDURE — 99211 OFF/OP EST MAY X REQ PHY/QHP: CPT

## 2020-07-02 ENCOUNTER — HOSPITAL ENCOUNTER (OUTPATIENT)
Dept: PHARMACY | Age: 65
Setting detail: THERAPIES SERIES
Discharge: HOME | End: 2020-07-02
Payer: MEDICARE

## 2020-07-02 LAB — INR BLD: 2.9

## 2020-07-02 PROCEDURE — 99211 OFF/OP EST MAY X REQ PHY/QHP: CPT

## 2020-07-02 NOTE — PROGRESS NOTES
[address] [facility] Anticoagulation Clinic - SELF-TEST Encounter    Patient Findings     Negatives:   Signs/symptoms of thrombosis, Signs/symptoms of bleeding, Laboratory test error suspected, Change in health, Change in alcohol use, Change in activity, Upcoming invasive procedure, Emergency department visit, Upcoming dental procedure, Missed doses, Extra doses, Change in medications, Change in diet/appetite, Hospital admission, Bruising, Other complaints           Assessment/Plan:  Patient is on warfarin for AFIB      INR is therapeutic at 2.9, goal is 2-3. Warfarin Dosing Orders: CONTINUE SAME (15MG MON/THURS; 10MG ALL OTHER DAYS)    Warfarin Dosing Orders Provided To: PATIENT    Self-Test Follow Up: 7/16    Next [facility] Scheduled Visit: July or August depending on COVID-19 -- pt stated he cancelled all appts b/c he is fearful to go out    Patient understands dosing directions and information discussed. Dosing schedule and follow up appointment given to patient. Patient acknowledges working in consult agreement with pharmacist as referred by his/her physician.     Alexa Bird PharmD 7/2/2020 12:28 PM    CLINICAL PHARMACY CONSULT: MED RECONCILIATION/REVIEW ADDENDUM    For Pharmacy Admin Tracking Only    PHSO: No  Total # of Interventions Recommended: 0  - Maintenance Safety Lab Monitoring #: 1  Total Interventions Accepted: 0  Time Spent (min): 5    Alexa Bird, RadhaD

## 2020-07-07 RX ORDER — WARFARIN SODIUM 10 MG/1
TABLET ORAL
Qty: 135 TABLET | Refills: 3 | Status: SHIPPED | OUTPATIENT
Start: 2020-07-07 | End: 2020-07-15 | Stop reason: SDUPTHER

## 2020-07-15 RX ORDER — WARFARIN SODIUM 10 MG/1
TABLET ORAL
Qty: 120 TABLET | Refills: 3 | Status: SHIPPED | OUTPATIENT
Start: 2020-07-15 | End: 2020-12-23 | Stop reason: SDUPTHER

## 2020-07-16 ENCOUNTER — HOSPITAL ENCOUNTER (OUTPATIENT)
Dept: PHARMACY | Age: 65
Setting detail: THERAPIES SERIES
Discharge: HOME | End: 2020-07-16
Payer: MEDICARE

## 2020-07-16 LAB — INR BLD: 2.7

## 2020-07-16 NOTE — PROGRESS NOTES
[address] [facility] Anticoagulation Clinic - SELF-TEST Encounter    Patient Findings     Negatives:   Signs/symptoms of thrombosis, Signs/symptoms of bleeding, Laboratory test error suspected, Change in health, Change in alcohol use, Change in activity, Upcoming invasive procedure, Emergency department visit, Upcoming dental procedure, Missed doses, Extra doses, Change in medications, Change in diet/appetite, Hospital admission, Bruising, Other complaints           Assessment/Plan:  Patient is on warfarin for atrial fibrillation. INR is therapeutic at 2.7, goal is 2-3. Warfarin Dosing Orders: same (15 mg every Mon./Thur.; 10 mg all other days)    Warfarin Dosing Orders Provided To: patient    Self-Test Follow Up: 3 weeks    Next [facility] Scheduled Visit: due in July but will post-pone d/t COVID-19    Patient understands dosing directions and information discussed. Dosing schedule and follow up appointment given to patient. Patient acknowledges working in consult agreement with pharmacist as referred by his/her physician. Lisa GarciaD 7/16/2020 2:48 PM    CLINICAL PHARMACY CONSULT: MED RECONCILIATION/REVIEW ADDENDUM    For Pharmacy Admin Tracking Only    PHSO: No  Total # of Interventions Recommended: 0    - Maintenance Safety Lab Monitoring #: 0  Recommended intervention potential cost savings:   Accepted intervention potential cost savings:    Total Interventions Accepted: 0  Time Spent (min): 323 [address], PharmD

## 2020-08-06 ENCOUNTER — HOSPITAL ENCOUNTER (OUTPATIENT)
Dept: PHARMACY | Age: 65
Setting detail: THERAPIES SERIES
Discharge: HOME | End: 2020-08-06
Payer: MEDICARE

## 2020-08-06 LAB — INR BLD: 2.4

## 2020-08-06 PROCEDURE — 99211 OFF/OP EST MAY X REQ PHY/QHP: CPT

## 2020-08-06 NOTE — PROGRESS NOTES
[address] - SELF-TEST Encounter    Patient Findings     Positives:   Change in medications (RECENTLY TOOK [address] A SINUS INFECTION; STARTED TAKING VITAMIN D)    Negatives:   Signs/symptoms of thrombosis, Signs/symptoms of bleeding, Laboratory test error suspected, Change in health, Change in alcohol use, Change in activity, Upcoming invasive procedure, Emergency department visit, Upcoming dental procedure, Missed doses, Extra doses, Change in diet/appetite, Hospital admission, Bruising, Other complaints           Assessment/Plan:  Patient is on warfarin for atrial fibrillation. INR is therapeutic at 2.4, goal is 2-3. Warfarin Dosing Orders: same (15 mg every Mon./Thur.; 10 mg all other days)    Warfarin Dosing Orders Provided To: patient    Self-Test Follow Up: 4 weeks    Next [facility] Scheduled Visit: TBD (due last month but will post-pone d/t     Patient understands dosing directions and information discussed. Dosing schedule and follow up appointment given to patient. Patient acknowledges working in consult agreement with pharmacist as referred by his/her physician. Maria Luz Funez PharmD 8/6/2020 3:36 PM     CLINICAL PHARMACY CONSULT: MED RECONCILIATION/REVIEW ADDENDUM    For Pharmacy Admin Tracking Only    PHSO: No  Total # of Interventions Recommended: 0    - Maintenance Safety Lab Monitoring #: 1  Recommended intervention potential cost savings:   Accepted intervention potential cost savings:    Total Interventions Accepted: 0  Time Spent (min): 323 [address], PharmD

## 2020-09-03 ENCOUNTER — HOSPITAL ENCOUNTER (OUTPATIENT)
Dept: PHARMACY | Age: 65
Setting detail: THERAPIES SERIES
Discharge: HOME | End: 2020-09-03
Payer: MEDICARE

## 2020-09-03 LAB — INR BLD: 2.5

## 2020-09-03 PROCEDURE — 99211 OFF/OP EST MAY X REQ PHY/QHP: CPT

## 2020-09-03 NOTE — PROGRESS NOTES
[facility] - SELF-TEST Encounter    Patient Findings     Negatives:   Signs/symptoms of thrombosis, Signs/symptoms of bleeding, Laboratory test error suspected, Change in health, Change in alcohol use, Change in activity, Upcoming invasive procedure, Emergency department visit, Upcoming dental procedure, Missed doses, Extra doses, Change in medications, Change in diet/appetite, Hospital admission, Bruising, Other complaints    Comments:   PCP SEPT. OR OCT. Assessment/Plan:  Patient is on warfarin for atrial fibrillation. INR is therapeutic at 2.5, goal is 2-3. Warfarin Dosing Orders: same (15 mg every Mon./Thur.; 10 mg all other days)    Warfarin Dosing Orders Provided To: patient    Self-Test Follow Up: tbd    Next [facility] Scheduled Visit: 10/7/20    Patient understands dosing directions and information discussed. Dosing schedule and follow up appointment given to patient. Patient acknowledges working in consult agreement with pharmacist as referred by his/her physician. Swetha Rodriguez PharmD 9/3/2020 4:39 PM    CLINICAL PHARMACY CONSULT: MED RECONCILIATION/REVIEW ADDENDUM    For Pharmacy Admin Tracking Only    PHSO: No  Total # of Interventions Recommended: 0    - Maintenance Safety Lab Monitoring #: 1  Recommended intervention potential cost savings:   Accepted intervention potential cost savings:    Total Interventions Accepted: 0  Time Spent (min): 323 [address], PharmD

## 2020-10-05 ENCOUNTER — HOSPITAL ENCOUNTER (OUTPATIENT)
Dept: PHARMACY | Age: 65
Setting detail: THERAPIES SERIES
Discharge: HOME | End: 2020-10-05
Payer: MEDICARE

## 2020-10-05 VITALS
HEART RATE: 59 BPM | TEMPERATURE: 96.5 F | SYSTOLIC BLOOD PRESSURE: 151 MMHG | WEIGHT: 276.6 LBS | BODY MASS INDEX: 33.67 KG/M2 | DIASTOLIC BLOOD PRESSURE: 92 MMHG

## 2020-10-05 LAB — INTERNATIONAL NORMALIZATION RATIO, POC: 3.3

## 2020-10-05 PROCEDURE — 99211 OFF/OP EST MAY X REQ PHY/QHP: CPT

## 2020-10-05 PROCEDURE — 85610 PROTHROMBIN TIME: CPT

## 2020-10-05 NOTE — PROGRESS NOTES
[address]    Patient Findings     Positives:   Change in health (HIS A1C INCREASED FROM 7.4 TO 7.9), Change in medications (HIS JARDIANCE WAS INCREASED FROM 10 MG TO 25 MG DAILY)    Negatives:   Signs/symptoms of thrombosis, Signs/symptoms of bleeding, Laboratory test error suspected, Change in alcohol use, Change in activity, Upcoming invasive procedure, Emergency department visit, Upcoming dental procedure, Missed doses, Extra doses, Change in diet/appetite, Hospital admission, Bruising, Other complaints         Patient  reports that he has never smoked. He has never used smokeless tobacco.     Assessment/Plan:  Warfarin indication: atrial fibrillation      INR today is SUPRAtherapeutic at 3.3, goal is 2-3. NO KNOWN REASON FOUND FOR ELEVATED INR. Warfarin Dose: HOLD TODAY'S DOSE THEN RETRY SAME REGIMEN (15 MG EVERY MON./THUR.; 10 MG ALL OTHER DAYS)    Follow Up: 2 weeks - AT HOME VIA SELF-TESTING    Patient understands dosing directions and information discussed. Dosing schedule and follow up appointment given to patient. Patient acknowledges working in consult agreement with pharmacist as referred by his/her physician. Patient passed the COVID-19 screening as per the [address] [place] [address] COVID-19 Screening Protocol. Aram Fleming PharmD 10/5/2020 1:08 PM    CLINICAL PHARMACY CONSULT: MED RECONCILIATION/REVIEW ADDENDUM    For Pharmacy Admin Tracking Only    PHSO: No  Total # of Interventions Recommended: 1  - Decreased Dose #: 1  - Maintenance Safety Lab Monitoring #: 1  Recommended intervention potential cost savings:   Accepted intervention potential cost savings:    Total Interventions Accepted: 1  Time Spent (min): 323 [address], PharmD

## 2020-10-19 ENCOUNTER — HOSPITAL ENCOUNTER (OUTPATIENT)
Dept: PHARMACY | Age: 65
Setting detail: THERAPIES SERIES
Discharge: HOME | End: 2020-10-19
Payer: MEDICARE

## 2020-10-19 LAB — INR BLD: 3.7

## 2020-11-02 ENCOUNTER — HOSPITAL ENCOUNTER (OUTPATIENT)
Dept: PHARMACY | Age: 65
Setting detail: THERAPIES SERIES
Discharge: HOME | End: 2020-11-02
Payer: MEDICARE

## 2020-11-02 LAB — INR BLD: 2.4

## 2020-11-02 PROCEDURE — 99211 OFF/OP EST MAY X REQ PHY/QHP: CPT

## 2020-11-02 NOTE — PROGRESS NOTES
[address] [facility] Anticoagulation Clinic - SELF-TEST Encounter    Patient Findings     Negatives:   Signs/symptoms of thrombosis, Signs/symptoms of bleeding, Laboratory test error suspected, Change in health, Change in alcohol use, Change in activity, Upcoming invasive procedure, Emergency department visit, Upcoming dental procedure, Missed doses, Extra doses, Change in medications, Change in diet/appetite, Hospital admission, Bruising, Other complaints    Comments:   PCP [facility], DR. Steve Hernandez. Assessment/Plan:  Patient is on warfarin for atrial fibrillation. INR is therapeutic at 2.4, goal is 2-3. Warfarin Dosing Orders: same (15 mg every Thur.; 10 mg all other days)    Warfarin Dosing Orders Provided To: patient    Self-Test Follow Up: 11/16 (2 weeks)    Next [facility] Scheduled Visit: due April 2021    Patient understands dosing directions and information discussed. Dosing schedule and follow up appointment given to patient. Patient acknowledges working in consult agreement with pharmacist as referred by his/her physician. Josue Nichols PharmD 11/2/2020 9:38 AM      CLINICAL PHARMACY CONSULT: MED RECONCILIATION/REVIEW ADDENDUM    For Pharmacy Admin Tracking Only    PHSO: No  Total # of Interventions Recommended: 0    - Maintenance Safety Lab Monitoring #: 1  Recommended intervention potential cost savings:   Accepted intervention potential cost savings:    Total Interventions Accepted: 0  Time Spent (min): 323 [address], PharmD

## 2020-11-16 ENCOUNTER — HOSPITAL ENCOUNTER (OUTPATIENT)
Dept: PHARMACY | Age: 65
Setting detail: THERAPIES SERIES
Discharge: HOME | End: 2020-11-16
Payer: MEDICARE

## 2020-11-16 LAB — INR BLD: 3.4

## 2020-11-16 NOTE — PROGRESS NOTES
[id number] [facility] Anticoagulation Clinic - SELF-TEST Encounter    Patient Findings     Negatives:   Signs/symptoms of thrombosis, Signs/symptoms of bleeding, Laboratory test error suspected, Change in health, Change in alcohol use, Change in activity, Upcoming invasive procedure, Emergency department visit, Upcoming dental procedure, Missed doses, Extra doses, Change in medications, Change in diet/appetite, Hospital admission, Bruising, Other complaints           Assessment/Plan:  Patient is on warfarin for atrial fibrillation. INR is SUPRAtherapeutic at 3.4, goal is 2-3. NO KNOWN CAUSE FOR HIGH INR READING. Warfarin Dosing Orders: HOLD TODAY THEN RESUME SAME DOSE OF 15 MG EVERY THUR.; 10 MG ALL OTHER DAYS    Warfarin Dosing Orders Provided To: patient     Self-Test Follow Up: 11/30 - 2 weeks    Next [facility] Scheduled Visit: due April 2021    Patient understands dosing directions and information discussed. Dosing schedule and follow up appointment given to patient. Patient acknowledges working in consult agreement with pharmacist as referred by his/her physician. Miriam Shaw PharmD 11/16/2020 11:51 AM      CLINICAL PHARMACY CONSULT: MED RECONCILIATION/REVIEW ADDENDUM    For Pharmacy Admin Tracking Only    PHSO: No  Total # of Interventions Recommended: 1  - Decreased Dose #: 1  - Maintenance Safety Lab Monitoring #: 1  Recommended intervention potential cost savings:   Accepted intervention potential cost savings:    Total Interventions Accepted: 1  Time Spent (min): 323 [address], PharmD

## 2020-11-30 ENCOUNTER — HOSPITAL ENCOUNTER (OUTPATIENT)
Dept: PHARMACY | Age: 65
Setting detail: THERAPIES SERIES
Discharge: HOME | End: 2020-11-30
Payer: MEDICARE

## 2020-11-30 LAB — INR BLD: 2.9

## 2020-12-14 ENCOUNTER — HOSPITAL ENCOUNTER (OUTPATIENT)
Dept: PHARMACY | Age: 65
Setting detail: THERAPIES SERIES
Discharge: HOME | End: 2020-12-14
Payer: MEDICARE

## 2020-12-14 LAB
INR BLD: 2.4
INTERNATIONAL NORMALIZATION RATIO, POC: 2.4

## 2020-12-14 PROCEDURE — 99211 OFF/OP EST MAY X REQ PHY/QHP: CPT

## 2020-12-23 ENCOUNTER — TELEPHONE (OUTPATIENT)
Dept: PHARMACY | Age: 65
End: 2020-12-23

## 2020-12-23 RX ORDER — WARFARIN SODIUM 10 MG/1
TABLET ORAL
Qty: 120 TABLET | Refills: 3 | Status: SHIPPED | OUTPATIENT
Start: 2020-12-23

## 2020-12-23 NOTE — TELEPHONE ENCOUNTER
Patient called requesting new RX to be sent to [address]. The company called his PCP, and denied his RX. Informed him that they would need an RX from us due to [address] his therapy.      Electronically signed by Katlyn Giron on 12/23/20 at 2:29 PM EST

## 2021-01-11 ENCOUNTER — HOSPITAL ENCOUNTER (OUTPATIENT)
Dept: PHARMACY | Age: 66
Setting detail: THERAPIES SERIES
Discharge: HOME | End: 2021-01-11
Payer: MEDICARE

## 2021-01-11 DIAGNOSIS — I48.0 PAROXYSMAL ATRIAL FIBRILLATION (HCC): ICD-10-CM

## 2021-01-11 LAB — INR BLD: 2.3

## 2021-01-11 PROCEDURE — 99211 OFF/OP EST MAY X REQ PHY/QHP: CPT

## 2021-01-11 NOTE — PROGRESS NOTES
[address] [facility] Anticoagulation Clinic - SELF-TEST Encounter    Patient Findings     Negatives:  Signs/symptoms of thrombosis, Signs/symptoms of bleeding, Laboratory test error suspected, Change in health, Change in alcohol use, Change in activity, Upcoming invasive procedure, Emergency department visit, Upcoming dental procedure, Missed doses, Extra doses, Change in medications, Change in diet/appetite, Hospital admission, Bruising, Other complaints    Comments:  GETTING MEDICARE IN [place]   DR. Brien Reyes. [address]           Assessment/Plan:  Patient is on warfarin for atrial fibrillation. INR is therapeutic at 2.3, goal is 2-3. Warfarin Dosing Orders: same (15 mg every Thur.; 10 mg all other days)    Warfarin Dosing Orders Provided To: patient     Self-Test Follow Up: 2/8/21    [facility] Scheduled Visit: due April     Patient understands dosing directions and information discussed. Dosing schedule and follow up appointment given to patient. Patient acknowledges working in consult agreement with pharmacist as referred by his/her physician. Farzana Glez PharmD 1/11/2021 3:00 PM      CLINICAL PHARMACY CONSULT: MED RECONCILIATION/REVIEW ADDENDUM    For Pharmacy Admin Tracking Only    PHSO: No  Total # of Interventions Recommended: 0    - Maintenance Safety Lab Monitoring #: 1  Recommended intervention potential cost savings:   Accepted intervention potential cost savings:    Total Interventions Accepted: 0  Time Spent (min): 323 [address], PharmD

## 2021-02-08 ENCOUNTER — HOSPITAL ENCOUNTER (OUTPATIENT)
Dept: PHARMACY | Age: 66
Setting detail: THERAPIES SERIES
Discharge: HOME | End: 2021-02-08
Payer: MEDICARE

## 2021-02-08 DIAGNOSIS — I48.0 PAROXYSMAL ATRIAL FIBRILLATION (HCC): ICD-10-CM

## 2021-02-08 LAB — INR BLD: 3

## 2021-02-08 PROCEDURE — 99211 OFF/OP EST MAY X REQ PHY/QHP: CPT

## 2021-02-08 NOTE — PROGRESS NOTES
[facility] Anticoagulation Clinic - SELF-TEST Encounter    Patient Findings     Negatives:  Signs/symptoms of thrombosis, Signs/symptoms of bleeding, Laboratory test error suspected, Change in health, Change in alcohol use, Change in activity, Upcoming invasive procedure, Emergency department visit, Upcoming dental procedure, Missed doses, Extra doses, Change in medications, Change in diet/appetite, Hospital admission, Bruising, Other complaints    Comments:  PCP IN April   GETTING MEDICARE IN [facility]         Assessment/Plan:  Patient is on warfarin for atrial fibrillation.       INR is therapeutic at 3.0, goal is 2-3.     Warfarin Dosing Orders: same (15 mg every Thur.; 10 mg all other days)     Warfarin Dosing Orders Provided To: patient      Self-Test Follow Up: 3/8/21     [facility] Scheduled Visit: due April        Patient understands dosing directions and information discussed. Dosing schedule and follow up appointment given to patient. Patient acknowledges working in consult agreement with pharmacist as referred by his/her physician. Royal Yoko GarciaD 2/8/2021 4:28 PM      CLINICAL PHARMACY CONSULT: MED RECONCILIATION/REVIEW ADDENDUM    For Pharmacy Admin Tracking Only    PHSO: No  Total # of Interventions Recommended: 0    - Maintenance Safety Lab Monitoring #: 1  Recommended intervention potential cost savings:   Accepted intervention potential cost savings:    Total Interventions Accepted: 0  Time Spent (min): 323 [address], PharmD

## 2021-03-08 ENCOUNTER — HOSPITAL ENCOUNTER (OUTPATIENT)
Dept: PHARMACY | Age: 66
Setting detail: THERAPIES SERIES
Discharge: HOME | End: 2021-03-08
Payer: MEDICARE

## 2021-03-08 DIAGNOSIS — I48.0 PAROXYSMAL ATRIAL FIBRILLATION (HCC): ICD-10-CM

## 2021-03-08 LAB — INR BLD: 3.2

## 2021-03-08 PROCEDURE — 99211 OFF/OP EST MAY X REQ PHY/QHP: CPT

## 2021-03-22 ENCOUNTER — HOSPITAL ENCOUNTER (OUTPATIENT)
Dept: PHARMACY | Age: 66
Setting detail: THERAPIES SERIES
Discharge: HOME | End: 2021-03-22
Payer: MEDICARE

## 2021-03-22 DIAGNOSIS — I48.0 PAROXYSMAL ATRIAL FIBRILLATION (HCC): ICD-10-CM

## 2021-03-22 LAB — INR BLD: 1.8

## 2021-04-05 ENCOUNTER — HOSPITAL ENCOUNTER (OUTPATIENT)
Dept: PHARMACY | Age: 66
Setting detail: THERAPIES SERIES
Discharge: HOME | End: 2021-04-05
Payer: MEDICARE

## 2021-04-05 DIAGNOSIS — I48.0 PAROXYSMAL ATRIAL FIBRILLATION (HCC): ICD-10-CM

## 2021-04-05 LAB — INR BLD: 2.6

## 2021-04-05 PROCEDURE — 99211 OFF/OP EST MAY X REQ PHY/QHP: CPT

## 2021-04-05 NOTE — PROGRESS NOTES
[facility] - SELF-TEST Encounter    Patient Findings     Negatives:  Signs/symptoms of thrombosis, Signs/symptoms of bleeding, Laboratory test error suspected, Change in health, Change in alcohol use, Change in activity, Upcoming invasive procedure, Emergency department visit, Upcoming dental procedure, Missed doses, Extra doses, Change in medications, Change in diet/appetite, Hospital admission, Bruising, Other complaints    Comments:  4/19: PCP            Assessment/Plan:  Patient is on warfarin for atrial fibrillation      INR is therapeutic at 2.6, goal is 2-3    Warfarin Dosing Orders: Same (15mg Thursday; 10mg all other days)    Warfarin Dosing Orders Provided To: Patient    Self-Test Follow Up: 2 weeks @ [facility] [address] Scheduled Visit: 4/21/21 at 3pm (2 weeks after COVID vaccine)    Patient understands dosing directions and information discussed. Dosing schedule and follow up appointment given to patient. Patient acknowledges working in consult agreement with pharmacist as referred by his/her physician. Shaunna Parrish PharmD 4/5/2021 11:38 AM    CLINICAL PHARMACY CONSULT: MED RECONCILIATION/REVIEW ADDENDUM    For Pharmacy Admin Tracking Only    PHSO: No  Total # of Interventions Recommended: 0  Recommended intervention potential cost savings:   Accepted intervention potential cost savings:    Total Interventions Accepted: 0  Time Spent (min): 15    Shaunna Parrish, PharmD

## 2021-04-20 ENCOUNTER — TELEPHONE (OUTPATIENT)
Dept: ADMINISTRATIVE | Age: 66
End: 2021-04-20

## 2021-04-21 ENCOUNTER — HOSPITAL ENCOUNTER (OUTPATIENT)
Dept: PHARMACY | Age: 66
Setting detail: THERAPIES SERIES
Discharge: HOME | End: 2021-04-21
Payer: MEDICARE

## 2021-04-21 VITALS
HEART RATE: 60 BPM | DIASTOLIC BLOOD PRESSURE: 93 MMHG | WEIGHT: 276.6 LBS | SYSTOLIC BLOOD PRESSURE: 171 MMHG | BODY MASS INDEX: 33.67 KG/M2

## 2021-04-21 DIAGNOSIS — I48.0 PAROXYSMAL ATRIAL FIBRILLATION (HCC): ICD-10-CM

## 2021-04-21 LAB — INTERNATIONAL NORMALIZATION RATIO, POC: 2.7

## 2021-04-21 PROCEDURE — 85610 PROTHROMBIN TIME: CPT

## 2021-05-04 ENCOUNTER — TELEPHONE (OUTPATIENT)
Dept: ADMINISTRATIVE | Age: 66
End: 2021-05-04

## 2021-05-04 NOTE — TELEPHONE ENCOUNTER
Pt called to schedule his ANNUAL appt with Dr. Scottie Dixon. Last seen 2019.   Please add to recall list

## 2021-05-19 ENCOUNTER — HOSPITAL ENCOUNTER (OUTPATIENT)
Dept: PHARMACY | Age: 66
Setting detail: THERAPIES SERIES
Discharge: HOME | End: 2021-05-19
Payer: MEDICARE

## 2021-05-19 DIAGNOSIS — I48.91 ATRIAL FIBRILLATION, UNSPECIFIED TYPE (HCC): Primary | ICD-10-CM

## 2021-05-19 LAB — INR BLD: 3

## 2021-05-19 PROCEDURE — 99211 OFF/OP EST MAY X REQ PHY/QHP: CPT

## 2021-05-19 NOTE — PROGRESS NOTES
[address] [facility] Anticoagulation Clinic - SELF-TEST Encounter    Patient Findings     Negatives:  Signs/symptoms of thrombosis, Signs/symptoms of bleeding, Laboratory test error suspected, Change in health, Change in alcohol use, Change in activity, Upcoming invasive procedure, Emergency department visit, Upcoming dental procedure, Missed doses, Extra doses, Change in medications, Change in diet/appetite, Hospital admission, Bruising, Other complaints    Comments:  DR. Cody Goodpasture DR. NORD IN [facility]           Assessment/Plan:  Patient is on warfarin for atrial fibrillation       INR is therapeutic at 3, goal is 2-3     Warfarin Dosing Orders: same (15 mg every Thursday; 10 mg all other days)     Warfarin Dosing Orders Provided To: patient     Self-Test Follow Up: 4 weeks          Next [facility] Scheduled Visit: due in Oct. 2021     Patient understands dosing directions and information discussed. Dosing schedule and follow up appointment given to patient. Patient acknowledges working in consult agreement with pharmacist as referred by his/her physician.     Akiko Layne [facility] PharmD 5/19/2021 9:33 AM    For Pharmacy Admin Tracking Only     Intervention Detail:    Total # of Interventions Recommended: 0   Total # of Interventions Accepted: 0   Time Spent (min): 10

## 2021-05-27 ENCOUNTER — OFFICE VISIT (OUTPATIENT)
Dept: CARDIOLOGY CLINIC | Age: 66
End: 2021-05-27
Payer: MEDICARE

## 2021-05-27 VITALS
RESPIRATION RATE: 14 BRPM | SYSTOLIC BLOOD PRESSURE: 132 MMHG | HEIGHT: 76 IN | HEART RATE: 80 BPM | BODY MASS INDEX: 33.36 KG/M2 | WEIGHT: 274 LBS | DIASTOLIC BLOOD PRESSURE: 82 MMHG

## 2021-05-27 DIAGNOSIS — I48.0 PAROXYSMAL ATRIAL FIBRILLATION (HCC): Primary | ICD-10-CM

## 2021-05-27 PROCEDURE — G8417 CALC BMI ABV UP PARAM F/U: HCPCS | Performed by: INTERNAL MEDICINE

## 2021-05-27 PROCEDURE — 1036F TOBACCO NON-USER: CPT | Performed by: INTERNAL MEDICINE

## 2021-05-27 PROCEDURE — 1123F ACP DISCUSS/DSCN MKR DOCD: CPT | Performed by: INTERNAL MEDICINE

## 2021-05-27 PROCEDURE — 3017F COLORECTAL CA SCREEN DOC REV: CPT | Performed by: INTERNAL MEDICINE

## 2021-05-27 PROCEDURE — 4040F PNEUMOC VAC/ADMIN/RCVD: CPT | Performed by: INTERNAL MEDICINE

## 2021-05-27 PROCEDURE — G8427 DOCREV CUR MEDS BY ELIG CLIN: HCPCS | Performed by: INTERNAL MEDICINE

## 2021-05-27 PROCEDURE — 93000 ELECTROCARDIOGRAM COMPLETE: CPT | Performed by: INTERNAL MEDICINE

## 2021-05-27 PROCEDURE — 99214 OFFICE O/P EST MOD 30 MIN: CPT | Performed by: INTERNAL MEDICINE

## 2021-05-27 NOTE — PROGRESS NOTES
CHIEF COMPLAINT: Atrial fibrillation/CAD    HPI: Patient is a 73 y.o. male seen at the request of Shayy Brooks MD.      Patient with the past medical history significant for atrial fibrillation who also carries a history of hypertension, hyperlipidemia and diabetes as well as obstructive sleep apnea. Patient was initially evaluated in 2004 at [facility] in [address] at which time patient had an abnormal EKG for which he underwent cardiac catheterization that showed 50% right coronary artery as the only significant lesion. Patient was placed on propafenone and underwent electrophysiology study, at which time he states they did an atrial flutter ablation. Records of this particular procedure are not available at this juncture. He states he has been continued on his warfarin and metoprolol since. Patient noted to have ongoing afib issues. Seen by EP here. Failed Tikosyn. Asymptomatic so observation recommended. Sought second opinion by EP in [facility]. Noted to be in sinus and was sinus for duration of a 2 week monitor. Observation was recommended. In atrial flutter. Patient denies chest pain or GELLER. Denies any edema or symptoms of heart failure. Asymptomatic.     Past Medical History:   Diagnosis Date    Atrial fibrillation (HCC)     CURRENT, CHRONIC A FIB, ON ANTICOAGULATON    Atrial flutter ([address]     CAD (coronary artery disease)     STATES SLIGHT BLOCKAGE, FOLLOWS WITH DR. Loretta Shrestha, [facility],,STRESS TEST DONE 12/20/13, SEE EPIC    Degenerative disc disease     CERVICAL    Degenerative disc disease     CERVICAL    Diabetes mellitus ([address]     [place], STATES HAS TO WORK ON WEIGHT LOSS    [place] cardiac [place]ization 2003 [place]    H/O cardiovascular stress test 12/20/13    SEE EPIC    Hyperlipidemia     Hypertension     STATES STABLE    Obstructive sleep apnea     S/P ablation of atrial fibrillation 2003       Patient Active Problem List   Diagnosis    Atrial fibrillation (HCC)    CAD (coronary artery disease)    Diabetes mellitus (HCC)    Hypertension    Atrial flutter (HCC)    Obstructive sleep apnea       No Known Allergies    Current Outpatient Medications   Medication Sig Dispense Refill    warfarin (COUMADIN) 10 MG tablet TAKE 1 OR 1 AND 1/2 TABLETS DAILY AS DIRECTED BY ANTICOAGULATION CLINIC 120 tablet 3    urea (CARMOL) 10 % cream Apply topically as needed Apply topically as needed.  empagliflozin (JARDIANCE) 25 MG tablet Take 25 mg by mouth daily       metFORMIN (GLUCOPHAGE) 500 MG tablet Take 1,000 mg by mouth 2 times daily (with meals)       glipiZIDE (GLUCOTROL) 5 MG tablet Take 10 mg by mouth 2 times daily (before meals)       losartan (COZAAR) 100 MG tablet Take 1 tablet by mouth daily 30 tablet 11    metoprolol (TOPROL-XL) 50 MG XL tablet take 1 tablet by mouth once daily 90 tablet 3    atorvastatin (LIPITOR) 40 MG tablet Take 40 mg by mouth nightly.  acarbose (PRECOSE) 100 MG tablet Take 100 mg by mouth 3 times daily (with meals)       doxazosin (CARDURA) 4 MG tablet Take 2 mg by mouth nightly.  VITAMIN D PO Take 2,500 Units by mouth daily  (Patient not taking: Reported on 5/27/2021)       No current facility-administered medications for this visit.        Social History     Socioeconomic History    Marital status:      Spouse name: Not on file    Number of children: Not on file    Years of education: Not on file    Highest education level: Not on file   Occupational History    Not on file   Tobacco Use    Smoking status: Never Smoker    Smokeless tobacco: Never Used   Substance and Sexual Activity    Alcohol use: No    Drug use: No    Sexual activity: Not on file   Other Topics Concern    Not on file   Social History Narrative    Not on file     Social Determinants of Health     Financial Resource Strain:     Difficulty of Paying Living Expenses:    Food Insecurity:     Worried About Running Out of Food in the Last Year:    [address] in the Last Year:    Transportation Needs:     Lack of Transportation (Medical):  Lack of Transportation (Non-Medical):    Physical Activity:     Days of Exercise per Week:     Minutes of Exercise per Session:    Stress:     Feeling of Stress :    Social Connections:     Frequency of Communication with Friends and Family:     Frequency of Social Gatherings with Friends and Family:     Attends Spiritism Services:     Active Member of Clubs or Organizations:     Attends Club or Organization Meetings:     Marital Status:    Intimate Partner Violence:     Fear of Current or Ex-Partner:     Emotionally Abused:     Physically Abused:     Sexually Abused:        History reviewed. No pertinent family history. Review of Systems:  Heart: as above   Lungs: as above   Eyes: denies changes in vision or discharge. Ears: denies changes in hearing or pain. Nose: denies epistaxis or masses   Throat: denies sore throat or trouble swallowing. Neuro: denies numbness, tingling, tremors. Skin: denies rashes or itching. : denies hematuria, dysuria   GI: denies vomiting, diarrhea   Psych: denies mood changed, anxiety, depression. Physical Exam   /82   Pulse 80   Resp 14   Ht 6' 4\" (1.93 m)   Wt 274 lb (124.3 kg)   BMI 33.35 kg/m²   Constitutional: Oriented to person, place, and time. Well-developed and well-nourished. No distress. Head: Normocephalic and atraumatic. Eyes: EOM are normal. Pupils are equal, round, and reactive to light. Neck: Normal range of motion. Neck supple. No hepatojugular reflux and no JVD present. Carotid bruit is not present. No tracheal deviation present. No thyromegaly present. Cardiovascular: Normal rate, regular rhythm, normal heart sounds and intact distal pulses. Exam reveals no gallop and no friction rub. No murmur heard. Pulmonary/Chest: Effort normal and breath sounds normal. No respiratory distress.  No wheezes. No rales. No tenderness. Abdominal: Soft. Bowel sounds are normal. No distension and no mass. No tenderness. No rebound and no guarding. Musculoskeletal: Normal range of motion. No edema and no tenderness. Lymphadenopathy:   No cervical adenopathy. No groin adenopathy. Neurological: Alert and oriented to person, place, and time. Skin: Skin is warm and dry. No rash noted. Not diaphoretic. No erythema. Psychiatric: Normal mood and affect. Behavior is normal.     EKG: Atrial flutter, nonspecific ST and T waves changes. ASSESSMENT AND PLAN:  Patient Active Problem List   Diagnosis    Atrial fibrillation ([facility]    CAD (coronary artery disease)    Diabetes mellitus ([facility]    Hypertension    Atrial flutter (HCC)    Obstructive sleep apnea     1. Atrial Flutter/Fib: In atrial flutter, rate controlled. Patient has history of atrial flutter ablation. Patient asymptomatic. Treat as chronic. Warfarin for stroke prevention. 2. CAD: Patient with a history of cardiac catheterization in 2004 at which time he had a 50% lesion of the RCA noted. Patient has multiple risk factors. Stable stress 12/13. Continue BB and statin. No ASA due to warfarin. 3. HTN: Observe. 4. Lipids: Statin. 5. DM: Per PCP. Jania Horan D.O.   Cardiologist  Cardiology, [facility]

## 2021-06-16 ENCOUNTER — HOSPITAL ENCOUNTER (OUTPATIENT)
Dept: PHARMACY | Age: 66
Setting detail: THERAPIES SERIES
Discharge: HOME | End: 2021-06-16
Payer: MEDICARE

## 2021-06-16 DIAGNOSIS — I48.0 PAROXYSMAL ATRIAL FIBRILLATION (HCC): Primary | ICD-10-CM

## 2021-06-16 LAB — INR BLD: 2.4

## 2021-06-16 PROCEDURE — 99212 OFFICE O/P EST SF 10 MIN: CPT

## 2021-06-16 NOTE — PROGRESS NOTES
[address] [facility] - SELF-TEST Encounter    Patient Findings     Negatives:  Signs/symptoms of thrombosis, Signs/symptoms of bleeding, Laboratory test error suspected, Change in health, Change in alcohol use, Change in activity, Upcoming invasive procedure, Emergency department visit, Upcoming dental procedure, Missed doses, Extra doses, Change in medications, Change in diet/appetite, Hospital admission, Bruising, Other complaints    Comments:  PODIATRIST--END OF JULY           Assessment/Plan:  Patient is on warfarin for atrial fibrillation     INR is therapeutic at 2.4, goal is 2-3    Warfarin Dosing Orders: SAME (15mg Thurs; 10mg all other days)    Warfarin Dosing Orders Provided To: patient    Self-Test Follow Up: 4 weeks    Next [address] [place] [address] Scheduled Visit: due in Oct 2021    Patient understands dosing directions and information discussed. Dosing schedule and follow up appointment given to patient. Patient acknowledges working in consult agreement with pharmacist as referred by his/her physician.     Justus Garvey [facility] PharmD 6/16/2021 10:37 AM     For Pharmacy Admin Tracking Only     Total # of Interventions Recommended: 0   Total # of Interventions Accepted: 0   Time Spent (min): 10

## 2021-07-14 ENCOUNTER — HOSPITAL ENCOUNTER (OUTPATIENT)
Dept: PHARMACY | Age: 66
Setting detail: THERAPIES SERIES
Discharge: HOME | End: 2021-07-14
Payer: MEDICARE

## 2021-07-14 DIAGNOSIS — I48.91 ATRIAL FIBRILLATION, UNSPECIFIED TYPE (HCC): Primary | ICD-10-CM

## 2021-07-14 LAB — INR BLD: 3

## 2021-07-14 PROCEDURE — 99211 OFF/OP EST MAY X REQ PHY/QHP: CPT

## 2021-07-14 NOTE — PROGRESS NOTES
[facility] - SELF-TEST Encounter    Patient Findings     Negatives:  Signs/symptoms of thrombosis, Signs/symptoms of bleeding, Laboratory test error suspected, Change in health, Change in alcohol use, Change in activity, Upcoming invasive procedure, Emergency department visit, Upcoming dental procedure, Missed doses, Extra doses, Change in medications, Change in diet/appetite, Hospital admission, Bruising, Other complaints    Comments:  DR. Luiz Sandy IN AUG. Assessment/Plan:  Patient is on warfarin for atrial fibrillation       INR is therapeutic at 3, goal is 2-3     Warfarin Dosing Orders: same (15 mg every Thursday; 10 mg all other days)     Warfarin Dosing Orders Provided To: patient     Self-Test Follow Up: 4 weeks          Next [facility] Scheduled Visit: due in Oct. 2021    Patient understands dosing directions and information discussed. Dosing schedule and follow up appointment given to patient. Patient acknowledges working in consult agreement with pharmacist as referred by his/her physician.     Angela Lopes, [address] [place] [address] PharmD 7/14/2021 11:32 AM    For Pharmacy Admin Tracking Only     Intervention Detail:    Total # of Interventions Recommended:    Total # of Interventions Accepted: 0   Time Spent (min): 10

## 2021-08-11 LAB — INR BLD: 2.3

## 2021-08-12 ENCOUNTER — HOSPITAL ENCOUNTER (OUTPATIENT)
Dept: PHARMACY | Age: 66
Setting detail: THERAPIES SERIES
Discharge: HOME | End: 2021-08-12
Payer: MEDICARE

## 2021-08-12 DIAGNOSIS — I48.91 ATRIAL FIBRILLATION, UNSPECIFIED TYPE (HCC): Primary | ICD-10-CM

## 2021-09-08 ENCOUNTER — HOSPITAL ENCOUNTER (OUTPATIENT)
Dept: PHARMACY | Age: 66
Setting detail: THERAPIES SERIES
Discharge: HOME | End: 2021-09-08
Payer: MEDICARE

## 2021-09-08 DIAGNOSIS — I48.91 ATRIAL FIBRILLATION, UNSPECIFIED TYPE (HCC): Primary | ICD-10-CM

## 2021-09-08 LAB — INR BLD: 2.2

## 2021-09-08 NOTE — PROGRESS NOTES
[address] - SELF-TEST Encounter      <<< VOICEMAIL >>>     Assessment/Plan:  Patient is on warfarin for atrial fibrillation       INR is therapeutic at 2.2, goal is 2-3     Warfarin Dosing Orders: same (15 mg every Thursday; 10 mg all other days)     Warfarin Dosing Orders Provided To: KELLIE      Self-Test Follow Up: 4 weeks - 10/6         [facility] Scheduled Visit: due in Oct. 2021     Cm Can, [address] [place] [address], PharmD 9/8/2021 3:38 PM    For Pharmacy Admin Tracking Only     Intervention Detail:    Total # of Interventions Recommended: 0   Total # of Interventions Accepted: 0   Time Spent (min): 5

## 2021-10-06 ENCOUNTER — HOSPITAL ENCOUNTER (OUTPATIENT)
Dept: PHARMACY | Age: 66
Setting detail: THERAPIES SERIES
Discharge: HOME | End: 2021-10-06
Payer: MEDICARE

## 2021-10-06 VITALS
WEIGHT: 270.4 LBS | BODY MASS INDEX: 32.91 KG/M2 | SYSTOLIC BLOOD PRESSURE: 150 MMHG | DIASTOLIC BLOOD PRESSURE: 89 MMHG | HEART RATE: 73 BPM

## 2021-10-06 DIAGNOSIS — I48.91 ATRIAL FIBRILLATION, UNSPECIFIED TYPE (HCC): Primary | ICD-10-CM

## 2021-10-06 LAB — INTERNATIONAL NORMALIZATION RATIO, POC: 2.2

## 2021-10-06 PROCEDURE — 85610 PROTHROMBIN TIME: CPT

## 2021-10-06 PROCEDURE — 99211 OFF/OP EST MAY X REQ PHY/QHP: CPT

## 2021-10-06 NOTE — PROGRESS NOTES
[address]    Patient Findings     Negatives:  Signs/symptoms of thrombosis, Signs/symptoms of bleeding, Laboratory test error suspected, Change in health, Change in alcohol use, Change in activity, Upcoming invasive procedure, Emergency department visit, Upcoming dental procedure, Missed doses, Extra doses, Change in medications, Change in diet/appetite, Hospital admission, Bruising, Other complaints    Comments:  DR. Rosita Jolly. Patient  reports that he has never smoked. He has never used smokeless tobacco.     Assessment/Plan:  Warfarin indication: atrial fibrillation      INR today is therapeutic at 2.2, goal is 2-3. Warfarin Dose: same (15 mg every Thur.; 10 mg all other days)    Follow Up: 4 weeks - SELF TEST @ HOME    Patient understands dosing directions and information discussed. Dosing schedule and follow up appointment given to patient. Patient acknowledges working in consult agreement with pharmacist as referred by his/her physician.     Yulisa Campos [facility] PharmD 10/6/2021 1:11 PM    For Pharmacy Admin Tracking Only     Intervention Detail:    Total # of Interventions Recommended: 0   Total # of Interventions Accepted: 0   Time Spent (min): 15

## 2021-11-04 ENCOUNTER — HOSPITAL ENCOUNTER (OUTPATIENT)
Dept: PHARMACY | Age: 66
Setting detail: THERAPIES SERIES
Discharge: HOME | End: 2021-11-04
Payer: MEDICARE

## 2021-11-04 DIAGNOSIS — I48.91 ATRIAL FIBRILLATION, UNSPECIFIED TYPE (HCC): Primary | ICD-10-CM

## 2021-11-04 LAB — INR BLD: 2.5

## 2021-11-04 PROCEDURE — 99211 OFF/OP EST MAY X REQ PHY/QHP: CPT

## 2021-12-02 LAB — INR BLD: 2.2

## 2021-12-03 ENCOUNTER — HOSPITAL ENCOUNTER (OUTPATIENT)
Dept: PHARMACY | Age: 66
Setting detail: THERAPIES SERIES
Discharge: HOME | End: 2021-12-03
Payer: MEDICARE

## 2021-12-03 DIAGNOSIS — I48.91 ATRIAL FIBRILLATION, UNSPECIFIED TYPE (HCC): Primary | ICD-10-CM

## 2021-12-03 PROCEDURE — 99211 OFF/OP EST MAY X REQ PHY/QHP: CPT

## 2021-12-03 NOTE — PROGRESS NOTES
[id number] [facility] - SELF-TEST Encounter    Patient Findings     Negatives:  Signs/symptoms of thrombosis, Signs/symptoms of bleeding, Laboratory test error suspected, Change in health, Change in alcohol use, Change in activity, Upcoming invasive procedure, Emergency department visit, Upcoming dental procedure, Missed doses, Extra doses, Change in medications, Change in diet/appetite, Hospital admission, Bruising, Other complaints    Comments:   [facility] 12/6  DR. CERRATO 12/15           Assessment/Plan:  Patient is on warfarin for atrial fibrillation. INR is therapeutic at 2.2, goal is 2-3. Warfarin Dosing Orders: same (15 mg every Thur.; 10 mg all other days)    Warfarin Dosing Orders Provided To: patient    Self-Test Follow Up: Jan. 5th     Next [address] [place] [address] Scheduled Visit: due in April 2022    Patient understands dosing directions and information discussed. Dosing schedule and follow up appointment given to patient. Patient acknowledges working in consult agreement with pharmacist as referred by his/her physician.     Roland Pulliam [facility] PharmD 12/3/2021 9:43 AM    For Pharmacy Admin Tracking Only     Intervention Detail:    Total # of Interventions Recommended: 0   Total # of Interventions Accepted: 0   Time Spent (min): 10

## 2022-01-05 ENCOUNTER — HOSPITAL ENCOUNTER (OUTPATIENT)
Dept: PHARMACY | Age: 67
Setting detail: THERAPIES SERIES
Discharge: HOME | End: 2022-01-05
Payer: MEDICARE

## 2022-01-05 DIAGNOSIS — I48.91 ATRIAL FIBRILLATION, UNSPECIFIED TYPE (HCC): Primary | ICD-10-CM

## 2022-01-05 LAB — INTERNATIONAL NORMALIZATION RATIO, POC: 2.7

## 2022-01-05 PROCEDURE — 99211 OFF/OP EST MAY X REQ PHY/QHP: CPT

## 2022-01-05 NOTE — PROGRESS NOTES
[address] - SELF-TEST Encounter     <<< SPOKE TO SPOUSE >>>    Assessment/Plan:  Patient is on warfarin for atrial fibrillation. INR is therapeutic at 2.7, goal is 2-3. Warfarin Dosing Orders: same (15 mg every Thur.; 10 mg all other days)    Warfarin Dosing Orders Provided To: patient's wife    Self-Test Follow Up: 2/2/22    [facility] Scheduled Visit: due April     Patient understands dosing directions and information discussed. Dosing schedule and follow up appointment given to patient. Patient acknowledges working in consult agreement with pharmacist as referred by his/her physician. Arcihe Lewis [facility] PharmD 1/5/2022 2:55 PM    Patient called back, discussed plan with him as well.    Archie Lewis [facility], PharmD 1/5/2022 5:15 PM      For Pharmacy Admin Tracking Only     Intervention Detail:    Total # of Interventions Recommended: 0   Total # of Interventions Accepted: 0   Time Spent (min): 10

## 2022-02-02 ENCOUNTER — HOSPITAL ENCOUNTER (OUTPATIENT)
Dept: PHARMACY | Age: 67
Setting detail: THERAPIES SERIES
Discharge: HOME | End: 2022-02-02
Payer: MEDICARE

## 2022-02-02 DIAGNOSIS — I48.91 ATRIAL FIBRILLATION, UNSPECIFIED TYPE (HCC): Primary | ICD-10-CM

## 2022-02-02 LAB — INR BLD: 2.4

## 2022-02-02 PROCEDURE — 99211 OFF/OP EST MAY X REQ PHY/QHP: CPT

## 2022-02-02 NOTE — PROGRESS NOTES
[address] - SELF-TEST Encounter    Patient Findings     Positives:  Change in health (PATIENT'S WIFE RECENTLY WITH COVID, THINKS HE ALSO HAD IT, FEELING BETTER.)    Negatives:  Signs/symptoms of thrombosis, Signs/symptoms of bleeding, Laboratory test error suspected, Change in alcohol use, Change in activity, Upcoming invasive procedure, Emergency department visit, Upcoming dental procedure, Missed doses, Extra doses, Change in medications, Change in diet/appetite, Hospital admission, Bruising, Other complaints    Comments:   [facility] IN FEB. Assessment/Plan:  Patient is on warfarin for atrial fibrillation. INR is therapeutic at 2.4, goal is 2-3. Warfarin Dosing Orders: same (15 mg every Thur.; 10 mg all other days)    Warfarin Dosing Orders Provided To: patient    Self-Test Follow Up: 4 weeks on 3/2/22    [address] Scheduled Visit: due April     Patient understands dosing directions and information discussed. Dosing schedule and follow up appointment given to patient. Patient acknowledges working in consult agreement with pharmacist as referred by his/her physician.     Sruthi Kapadia, [address] [place] [address] PharmD 2/2/2022 10:30 AM    For Pharmacy Admin Tracking Only     Intervention Detail:    Total # of Interventions Recommended: 0   Total # of Interventions Accepted: 0   Time Spent (min): 10

## 2022-03-02 ENCOUNTER — HOSPITAL ENCOUNTER (OUTPATIENT)
Dept: PHARMACY | Age: 67
Setting detail: THERAPIES SERIES
Discharge: HOME | End: 2022-03-02
Payer: MEDICARE

## 2022-03-02 DIAGNOSIS — I48.91 ATRIAL FIBRILLATION, UNSPECIFIED TYPE (HCC): Primary | ICD-10-CM

## 2022-03-02 LAB — INR BLD: 2.5

## 2022-03-02 PROCEDURE — 99211 OFF/OP EST MAY X REQ PHY/QHP: CPT

## 2022-03-02 RX ORDER — ONDANSETRON 8 MG/1
8 TABLET, ORALLY DISINTEGRATING ORAL EVERY 8 HOURS PRN
COMMUNITY
Start: 2022-02-08

## 2022-03-02 NOTE — PROGRESS NOTES
[address] - SELF-TEST Encounter    Patient Findings     Positives:  Change in health (PATIENT REPORTS VOMITING RELATED TO GALLSTONES), Change in medications (TAKING ONDANSETRON FOR NAUSEA)    Negatives:  Signs/symptoms of thrombosis, Signs/symptoms of bleeding, Laboratory test error suspected, Change in alcohol use, Change in activity, Upcoming invasive procedure, Emergency department visit, Upcoming dental procedure, Missed doses, Extra doses, Change in diet/appetite, Hospital admission, Bruising, Other complaints    Comments:  SURGEON 3/22           Assessment/Plan:  Patient is on warfarin for atrial fibrillation. INR is therapeutic at 2.5, goal is 2-3.     Warfarin Dosing Orders: same (15 mg every Thur.; 10 mg all other days)    Warfarin Dosing Orders Provided To: patient    Self-Test Follow Up: 4 weeks on 3/30    [facility] Scheduled Visit: MARY Nichols [facility] PharmD 3/2/2022 9:39 AM      For Pharmacy Admin Tracking Only     Intervention Detail:    Total # of Interventions Recommended: 0   Total # of Interventions Accepted: 0   Time Spent (min): 10

## 2022-03-30 ENCOUNTER — HOSPITAL ENCOUNTER (OUTPATIENT)
Dept: PHARMACY | Age: 67
Setting detail: THERAPIES SERIES
Discharge: HOME | End: 2022-03-30
Payer: MEDICARE

## 2022-03-30 DIAGNOSIS — I48.91 ATRIAL FIBRILLATION, UNSPECIFIED TYPE (HCC): Primary | ICD-10-CM

## 2022-03-30 LAB — INR BLD: 2.3

## 2022-03-30 PROCEDURE — 99211 OFF/OP EST MAY X REQ PHY/QHP: CPT

## 2022-03-30 RX ORDER — OMEPRAZOLE 20 MG/1
20 CAPSULE, DELAYED RELEASE ORAL DAILY
COMMUNITY
Start: 2022-03-22 | End: 2022-06-16

## 2022-03-30 NOTE — PROGRESS NOTES
[facility] - SELF-TEST Encounter    Patient Findings     Positives:  Change in health (PATIENT HAVING ABDOMINAL PAIN, TO HAVE EGD), Upcoming invasive procedure (EGD SCHEDULED FOR 4/6, PER PATIENT HIS DIRECTIONS SAID TO HOLD WARFARIN 2-5 DAYS PRIOR TO.  I TOLD PATIENT TO HOLD FOR 3 DAYS. ), Change in medications (NOW ON OMEPRAZOLE 20 MG DAILY AND ZOFRAN PRN)    Negatives:  Signs/symptoms of thrombosis, Signs/symptoms of bleeding, Laboratory test error suspected, Change in alcohol use, Change in activity, Emergency department visit, Upcoming dental procedure, Missed doses, Extra doses, Change in diet/appetite, Hospital admission, Bruising, Other complaints    Comments:  4/25:  [facility]  4/29: DR. Manisha Lemus  APR. - DUE TO SEE DR. CERRATO  5/9: EYE DOC           Assessment/Plan:  Warfarin Indication:  atrial fibrillation      INR is therapeutic at 2.3, goal is 2-3.     Warfarin Dosing Orders: same (15 mg every Thur.; 10 mg all other days)    Warfarin Dosing Orders Provided To: patient     Self-Test Follow Up: 4/27 (4 weeks)    Next [facility] Scheduled Visit: scheduled May 25th      MARY Santo DAMARIS [facility] PharmD 3/30/2022 10:14 AM    For Pharmacy Admin Tracking Only     Intervention Detail: Dose Adjustment: 1, reason: Therapy De-escalation   Total # of Interventions Recommended: 1   Total # of Interventions Accepted: 1   Time Spent (min): 10

## 2022-04-06 ENCOUNTER — HOSPITAL ENCOUNTER (OUTPATIENT)
Age: 67
Discharge: HOME | End: 2022-04-08

## 2022-04-06 PROCEDURE — 88305 TISSUE EXAM BY PATHOLOGIST: CPT

## 2022-04-27 ENCOUNTER — HOSPITAL ENCOUNTER (OUTPATIENT)
Dept: PHARMACY | Age: 67
Setting detail: THERAPIES SERIES
Discharge: HOME | End: 2022-04-27
Payer: MEDICARE

## 2022-04-27 DIAGNOSIS — I48.91 ATRIAL FIBRILLATION, UNSPECIFIED TYPE (HCC): Primary | ICD-10-CM

## 2022-04-27 LAB — INR BLD: 1.9

## 2022-04-27 PROCEDURE — 99211 OFF/OP EST MAY X REQ PHY/QHP: CPT

## 2022-04-27 NOTE — PROGRESS NOTES
[address] [facility] - SELF-TEST Encounter    Patient Findings     Positives:  Change in health (EGD ON 4/6 SHOWED MILD GASTRITIS), Change in medications (HAD EMG WHICH SHOWED SEVERE NEUROPATHY NOW USING DICLOFENAC GEL AND FOLTX DAILY), Change in diet/appetite (HAD 1/2 DOZEN ASPARAGUS CLEMENTS 3 TIMES IN PAST WEEK)    Negatives:  Signs/symptoms of thrombosis, Signs/symptoms of bleeding, Laboratory test error suspected, Change in alcohol use, Change in activity, Upcoming invasive procedure, Emergency department visit, Upcoming dental procedure, Missed doses, Extra doses, Hospital admission, Bruising, Other complaints           Assessment/Plan:  Warfarin Indication:  atrial fibrillation      INR is SUBtherapeutic at 1.9, goal is 2-3. INR LIKELY LOW D/T INCREASED VITAMIN K IN HIS DIET WITH ASPARAGUS.      Warfarin Dosing Orders: same (15 mg every Thur.; 10 mg all other days)    Warfarin Dosing Orders Provided To: patient    Self-Test Follow Up: 5/25, WILL CHECK IN AM PRIOR TO [facility] VISIT    Next [facility] Scheduled Visit: 5/25      MARY Kimball [facility] PharmD 4/27/2022 1:23 PM    For Pharmacy Admin Tracking Only     Intervention Detail:    Total # of Interventions Recommended:    Total # of Interventions Accepted: 0   Time Spent (min): 10

## 2022-05-26 ENCOUNTER — HOSPITAL ENCOUNTER (OUTPATIENT)
Dept: PHARMACY | Age: 67
Setting detail: THERAPIES SERIES
Discharge: HOME | End: 2022-05-26
Payer: MEDICARE

## 2022-05-26 VITALS
WEIGHT: 263 LBS | HEART RATE: 73 BPM | BODY MASS INDEX: 32.01 KG/M2 | SYSTOLIC BLOOD PRESSURE: 151 MMHG | DIASTOLIC BLOOD PRESSURE: 94 MMHG

## 2022-05-26 DIAGNOSIS — I48.91 ATRIAL FIBRILLATION, UNSPECIFIED TYPE (HCC): Primary | ICD-10-CM

## 2022-05-26 LAB
INR BLD: 2.6
INTERNATIONAL NORMALIZATION RATIO, POC: 2.6

## 2022-05-26 PROCEDURE — 85610 PROTHROMBIN TIME: CPT

## 2022-05-26 PROCEDURE — 99211 OFF/OP EST MAY X REQ PHY/QHP: CPT

## 2022-05-26 NOTE — PROGRESS NOTES
[address]    Patient Findings     Negatives:  Signs/symptoms of thrombosis, Signs/symptoms of bleeding, Laboratory test error suspected, Change in health, Change in alcohol use, Change in activity, Upcoming invasive procedure, Emergency department visit, Upcoming dental procedure, Missed doses, Extra doses, Change in medications, Change in diet/appetite, Hospital admission, Bruising, Other complaints    Comments:  - [facility]- PODIATRIST          Patient  reports that he has never smoked. He has never used smokeless tobacco.     Assessment/Plan:  Warfarin indication: atrial fibrillation      INR today is therapeutic at 2.6, goal is 2-3.  INR WAS ALSO 2.6 FROM SELF TEST READING    Warfarin Dose: same (15 mg every Thur.; 10 mg all other days)    Follow Up: 4 weeks - SELF TEST @ [address] [facility], [facility] PharmD 2022 1:35 PM    For Pharmacy Admin Tracking Only     Intervention Detail: Adherence Monitorin   Total # of Interventions Recommended:    Total # of Interventions Accepted:    Time Spent (min): 15

## 2022-06-16 ENCOUNTER — OFFICE VISIT (OUTPATIENT)
Dept: CARDIOLOGY CLINIC | Age: 67
End: 2022-06-16
Payer: MEDICARE

## 2022-06-16 VITALS
HEART RATE: 52 BPM | HEIGHT: 76 IN | SYSTOLIC BLOOD PRESSURE: 110 MMHG | RESPIRATION RATE: 16 BRPM | BODY MASS INDEX: 31.9 KG/M2 | DIASTOLIC BLOOD PRESSURE: 78 MMHG | WEIGHT: 262 LBS

## 2022-06-16 DIAGNOSIS — I48.0 PAROXYSMAL ATRIAL FIBRILLATION (HCC): Primary | ICD-10-CM

## 2022-06-16 PROCEDURE — 99214 OFFICE O/P EST MOD 30 MIN: CPT | Performed by: INTERNAL MEDICINE

## 2022-06-16 PROCEDURE — G8417 CALC BMI ABV UP PARAM F/U: HCPCS | Performed by: INTERNAL MEDICINE

## 2022-06-16 PROCEDURE — 3017F COLORECTAL CA SCREEN DOC REV: CPT | Performed by: INTERNAL MEDICINE

## 2022-06-16 PROCEDURE — 93000 ELECTROCARDIOGRAM COMPLETE: CPT | Performed by: INTERNAL MEDICINE

## 2022-06-16 PROCEDURE — 1036F TOBACCO NON-USER: CPT | Performed by: INTERNAL MEDICINE

## 2022-06-16 PROCEDURE — G8427 DOCREV CUR MEDS BY ELIG CLIN: HCPCS | Performed by: INTERNAL MEDICINE

## 2022-06-16 PROCEDURE — 1123F ACP DISCUSS/DSCN MKR DOCD: CPT | Performed by: INTERNAL MEDICINE

## 2022-06-16 RX ORDER — LEVOMEFOLATE/B6/B12/ALGAL OIL 3-35-2 MG
CAPSULE ORAL
COMMUNITY
Start: 2022-05-31

## 2022-06-16 NOTE — PROGRESS NOTES
CHIEF COMPLAINT: Atrial fibrillation/CAD    HPI: Patient is a 78 y.o. male seen at the request of Araceli Jaime DO. Patient with the past medical history significant for atrial fibrillation who also carries a history of hypertension, hyperlipidemia and diabetes as well as obstructive sleep apnea. Patient was initially evaluated in 2004 at [facility] in [address] at which time patient had an abnormal EKG for which he underwent cardiac catheterization that showed 50% right coronary artery as the only significant lesion. Patient was placed on propafenone and underwent electrophysiology study, at which time he states they did an atrial flutter ablation. Records of this particular procedure are not available at this juncture. He states he has been continued on his warfarin and metoprolol since. Patient noted to have ongoing afib issues. Seen by EP here. Failed Tikosyn. Asymptomatic so observation recommended. Sought second opinion by EP in [facility]. Noted to be in sinus and was sinus for duration of a 2 week monitor. Observation was recommended. In sinus. Patient denies chest pain or GELLER. Denies any edema or symptoms of heart failure. Asymptomatic.     Past Medical History:   Diagnosis Date    Atrial fibrillation (HCC)     CURRENT, CHRONIC A FIB, ON ANTICOAGULATON    Atrial flutter ([address]     CAD (coronary artery disease)     STATES SLIGHT BLOCKAGE, FOLLOWS WITH DR. Vinny Valentino, DCA,,STRESS TEST DONE 12/20/13, SEE EPIC    Degenerative disc disease     CERVICAL    Degenerative disc disease     CERVICAL    Diabetes mellitus ([address]     STEADY HIGH RANGE, STATES HAS TO WORK ON WEIGHT LOSS    [place] cardiac [place] 2003 [place]    H/O cardiovascular stress test 12/20/13    SEE EPIC    Hyperlipidemia     Hypertension     STATES STABLE    Obstructive sleep apnea     S/P ablation of atrial fibrillation 2003       Patient Active Problem List   Diagnosis    Atrial fibrillation ([address]    CAD (coronary artery disease)    Diabetes mellitus (HCC)    Hypertension    Atrial flutter (HCC)    Obstructive sleep apnea       No Known Allergies    Current Outpatient Medications   Medication Sig Dispense Refill    L-Methylfolate-Algae-B12-B6 (FOLTANX RF) 5-60.016-6-51 MG CAPS TAKE 1 CAPSULE BY MOUTH DAILY      diclofenac sodium (VOLTAREN) 1 % GEL Apply topically 2 times daily      ondansetron (ZOFRAN-ODT) 8 MG TBDP disintegrating tablet Take 8 mg by mouth every 8 hours as needed      warfarin (COUMADIN) 10 MG tablet TAKE 1 OR 1 AND 1/2 TABLETS DAILY AS DIRECTED BY ANTICOAGULATION CLINIC 120 tablet 3    urea (CARMOL) 10 % cream Apply topically as needed Apply topically as needed.  empagliflozin (JARDIANCE) 25 MG tablet Take 25 mg by mouth daily       metFORMIN (GLUCOPHAGE) 500 MG tablet Take 1,000 mg by mouth 2 times daily (with meals)       glipiZIDE (GLUCOTROL) 5 MG tablet Take 10 mg by mouth 2 times daily (before meals)       losartan (COZAAR) 100 MG tablet Take 1 tablet by mouth daily 30 tablet 11    metoprolol (TOPROL-XL) 50 MG XL tablet take 1 tablet by mouth once daily 90 tablet 3    atorvastatin (LIPITOR) 40 MG tablet Take 40 mg by mouth nightly.  acarbose (PRECOSE) 100 MG tablet Take 100 mg by mouth 3 times daily (with meals)       doxazosin (CARDURA) 4 MG tablet Take 2 mg by mouth nightly.  folic acid-pyridoxine-cyanocobalamine (FOLTX) 2.5-25-1 MG TABS tablet Take 1 tablet by mouth daily (Patient not taking: Reported on 6/16/2022)      omeprazole (PRILOSEC) 20 MG delayed release capsule Take 20 mg by mouth daily (Patient not taking: Reported on 6/16/2022)       No current facility-administered medications for this visit.        Social History     Socioeconomic History    Marital status:      Spouse name: Not on file    Number of children: Not on file    Years of education: Not on file    Highest education level: Not on file   Occupational rashes or itching. : denies hematuria, dysuria   GI: denies vomiting, diarrhea   Psych: denies mood changed, anxiety, depression. Physical Exam   /78   Pulse 52   Resp 16   Ht 6' 4\" (1.93 m)   Wt 262 lb (118.8 kg)   BMI 31.89 kg/m²   Constitutional: Oriented to person, place, and time. Well-developed and well-nourished. No distress. Head: Normocephalic and atraumatic. Eyes: EOM are normal. Pupils are equal, round, and reactive to light. Neck: Normal range of motion. Neck supple. No hepatojugular reflux and no JVD present. Carotid bruit is not present. No tracheal deviation present. No thyromegaly present. Cardiovascular: Normal rate, regular rhythm, normal heart sounds and intact distal pulses. Exam reveals no gallop and no friction rub. No murmur heard. Pulmonary/Chest: Effort normal and breath sounds normal. No respiratory distress. No wheezes. No rales. No tenderness. Abdominal: Soft. Bowel sounds are normal. No distension and no mass. No tenderness. No rebound and no guarding. Musculoskeletal: Normal range of motion. No edema and no tenderness. Lymphadenopathy:   No cervical adenopathy. No groin adenopathy. Neurological: Alert and oriented to person, place, and time. Skin: Skin is warm and dry. No rash noted. Not diaphoretic. No erythema. Psychiatric: Normal mood and affect. Behavior is normal.     EKG: In sinus, nonspecific ST and T waves changes. ASSESSMENT AND PLAN:  Patient Active Problem List   Diagnosis    [facility]    CAD (coronary artery disease)    Diabetes mellitus ([facility]    Hypertension    Atrial flutter (HCC)    Obstructive sleep apnea     1. Atrial Flutter/Fib: In sinus. Patient has history of atrial flutter ablation. Patient asymptomatic. Treat as chronic. Warfarin for stroke prevention. 2. CAD: Patient with a history of cardiac catheterization in 2004 at which time he had a 50% lesion of the RCA noted.   Patient has multiple risk factors. Stable stress 12/13. Continue BB and statin. No ASA due to warfarin. 3. HTN: Observe. 4. Lipids: Statin. 5. DM: Per PCP. Figueroa Santoro D.O.   Cardiologist  Cardiology, [facility]

## 2022-06-17 ENCOUNTER — TELEPHONE (OUTPATIENT)
Dept: CARDIOLOGY CLINIC | Age: 67
End: 2022-06-17

## 2022-06-17 NOTE — TELEPHONE ENCOUNTER
Patient states he is going on a trip to [facility] [place] in September and will be going [place] that [place] 10,[place] elevation and limited oxygen, patient is asking if that would be safe for him to do,please advise

## 2022-06-17 NOTE — TELEPHONE ENCOUNTER
Okay to do, just make sure to pace himself. Breezy Perry D.O.   Cardiologist  Cardiology, [phone]

## 2022-06-23 ENCOUNTER — HOSPITAL ENCOUNTER (OUTPATIENT)
Dept: PHARMACY | Age: 67
Setting detail: THERAPIES SERIES
Discharge: HOME | End: 2022-06-23
Payer: MEDICARE

## 2022-06-23 DIAGNOSIS — I48.91 ATRIAL FIBRILLATION, UNSPECIFIED TYPE (HCC): Primary | ICD-10-CM

## 2022-06-23 LAB — INR BLD: 3.6

## 2022-06-23 PROCEDURE — 99212 OFFICE O/P EST SF 10 MIN: CPT

## 2022-07-07 ENCOUNTER — HOSPITAL ENCOUNTER (OUTPATIENT)
Dept: PHARMACY | Age: 67
Setting detail: THERAPIES SERIES
Discharge: HOME | End: 2022-07-07
Payer: MEDICARE

## 2022-07-07 DIAGNOSIS — I48.91 ATRIAL FIBRILLATION, UNSPECIFIED TYPE (HCC): Primary | ICD-10-CM

## 2022-07-07 LAB — INR BLD: 2.1

## 2022-07-07 PROCEDURE — 99211 OFF/OP EST MAY X REQ PHY/QHP: CPT

## 2022-07-07 NOTE — PROGRESS NOTES
[id number] [facility] - SELF-TEST Encounter    Patient Findings     Negatives:  Signs/symptoms of thrombosis, Signs/symptoms of bleeding, Laboratory test error suspected, Change in health, Change in alcohol use, Change in activity, Upcoming invasive procedure, Emergency department visit, Upcoming dental procedure, Missed doses, Extra doses, Change in medications, Change in diet/appetite, Hospital admission, Bruising, Other complaints    Comments:  DENTIST NEXT WEEK  UROLOGIST THIS MONTH           Assessment/Plan:  Warfarin Indication:  atrial fibrillation      INR is therapeutic at 2.1, goal is 2-3.     Warfarin Dosing Orders: same (15 mg every Thur.; 10 mg all other days)    Warfarin Dosing Orders Provided To: patient     Self-Test Follow Up: 4 weeks on 8/4    Next [facility] Scheduled Visit: Ruben Gaytan [facility] PharmD 7/7/2022 2:20 PM    For Pharmacy Admin Tracking Only     Intervention Detail:    Total # of Interventions Recommended: 0   Total # of Interventions Accepted: 0   Time Spent (min): 15

## 2022-08-04 ENCOUNTER — HOSPITAL ENCOUNTER (OUTPATIENT)
Dept: PHARMACY | Age: 67
Setting detail: THERAPIES SERIES
Discharge: HOME | End: 2022-08-04
Payer: MEDICARE

## 2022-08-04 DIAGNOSIS — I48.0 PAROXYSMAL ATRIAL FIBRILLATION (HCC): Primary | ICD-10-CM

## 2022-08-04 LAB — INR BLD: 2.8

## 2022-08-04 PROCEDURE — 99211 OFF/OP EST MAY X REQ PHY/QHP: CPT

## 2022-08-04 NOTE — PROGRESS NOTES
[id number] [facility]agulation [facility] - SELF-TEST Encounter    Patient Findings       Negatives:  Signs/symptoms of thrombosis, Signs/symptoms of bleeding, Laboratory test error suspected, Change in health, Change in alcohol use, Change in activity, Upcoming invasive procedure, Emergency department visit, Upcoming dental procedure, Missed doses, Extra doses, Change in medications, Change in diet/appetite, Hospital admission, Bruising, Other complaints             Assessment/Plan:  Warfarin Indication:  atrial fibrillation      INR is therapeutic at 2.8, goal is 2-3.     Warfarin Dosing Orders: same (15 mg every Thur.; 10 mg all other days)    Warfarin Dosing Orders Provided To: patient    Self-Test Follow Up: 9/1    [facility] Scheduled Visit: due in Nov.      Sophy Renee, [address] [place] [address] PharmD 8/4/2022 1:12 PM    For Pharmacy Admin Tracking Only    Intervention Detail:   Total # of Interventions Recommended: 0  Total # of Interventions Accepted: 0  Time Spent (min): 10

## 2022-09-01 ENCOUNTER — HOSPITAL ENCOUNTER (OUTPATIENT)
Dept: PHARMACY | Age: 67
Setting detail: THERAPIES SERIES
Discharge: HOME | End: 2022-09-01
Payer: MEDICARE

## 2022-09-01 DIAGNOSIS — I48.0 PAROXYSMAL ATRIAL FIBRILLATION (HCC): Primary | ICD-10-CM

## 2022-09-01 LAB — INR BLD: 2.3

## 2022-09-01 PROCEDURE — 99211 OFF/OP EST MAY X REQ PHY/QHP: CPT

## 2022-10-10 ENCOUNTER — HOSPITAL ENCOUNTER (OUTPATIENT)
Dept: PHARMACY | Age: 67
Setting detail: THERAPIES SERIES
Discharge: HOME | End: 2022-10-10
Payer: MEDICARE

## 2022-10-10 DIAGNOSIS — I48.0 PAROXYSMAL ATRIAL FIBRILLATION (HCC): Primary | ICD-10-CM

## 2022-10-10 LAB — INR BLD: 2.8

## 2022-10-10 PROCEDURE — 99211 OFF/OP EST MAY X REQ PHY/QHP: CPT

## 2022-10-10 NOTE — PROGRESS NOTES
[facility] - SELF-TEST Encounter    Patient Findings       Negatives:  Signs/symptoms of thrombosis, Signs/symptoms of bleeding, Laboratory test error suspected, Change in health, Change in alcohol use, Change in activity, Upcoming invasive procedure, Emergency department visit, Upcoming dental procedure, Missed doses, Extra doses, Change in medications, Change in diet/appetite, Hospital admission, Bruising, Other complaints             Assessment/Plan:  Warfarin Indication:  atrial fibrillation      INR is therapeutic at 2.8, goal is 2-3.     Warfarin Dosing Orders: same (15 mg every Thur.; 10 mg all other days)    Warfarin Dosing Orders Provided To: patient    Self-Test Follow Up: 11/7/22    [facility] Scheduled Visit: 11/7/22      MARY Velasquez DAMARIS [facility] PharmD 10/10/2022 11:33 AM    For Pharmacy Admin Tracking Only    Intervention Detail:   Total # of Interventions Recommended: 0  Total # of Interventions Accepted: 0  Time Spent (min): 10

## 2022-11-07 ENCOUNTER — HOSPITAL ENCOUNTER (OUTPATIENT)
Dept: PHARMACY | Age: 67
Setting detail: THERAPIES SERIES
Discharge: HOME | End: 2022-11-07
Payer: MEDICARE

## 2022-11-07 VITALS
WEIGHT: 269.2 LBS | BODY MASS INDEX: 32.77 KG/M2 | DIASTOLIC BLOOD PRESSURE: 76 MMHG | SYSTOLIC BLOOD PRESSURE: 160 MMHG | HEART RATE: 53 BPM

## 2022-11-07 DIAGNOSIS — I48.0 PAROXYSMAL ATRIAL FIBRILLATION (HCC): Primary | ICD-10-CM

## 2022-11-07 LAB
INR BLD: 2.5
INR BLD: 2.8

## 2022-11-07 PROCEDURE — 85610 PROTHROMBIN TIME: CPT

## 2022-11-07 PROCEDURE — 99211 OFF/OP EST MAY X REQ PHY/QHP: CPT

## 2022-11-07 NOTE — PROGRESS NOTES
[facility] [address]    Patient Findings       Positives:  Change in health (PT REPORTS HIS A1C WAS 8%, PT REPORTS PAIN IN THE RIGHT LEG AND BUTTOCKS)    Negatives:  Signs/symptoms of thrombosis, Signs/symptoms of bleeding, Laboratory test error suspected, Change in alcohol use, Change in activity, Upcoming invasive procedure, Emergency department visit, Upcoming dental procedure, Missed doses, Extra doses, Change in medications, Change in diet/appetite, Hospital admission, Bruising, Other complaints           Patient  reports that he has never smoked. He has never used smokeless tobacco.     Assessment/Plan:  Warfarin indication: atrial fibrillation      INR today is therapeutic at 2.5, goal is 2-3. Patient self-tested at home this am and INR was 2.8.     Warfarin Dose: same (15 mg every Thur, 10 mg all other days)    Follow Up: 4 weeks (self-test at home)      Leoncio Burrell PharmD Candidate 11/7/2022 1:11 PM   For Pharmacy Admin Tracking Only    Intervention Detail:   Total # of Interventions Recommended: 0  Total # of Interventions Accepted: 0  Time Spent (min): 15

## 2022-11-09 ENCOUNTER — TELEPHONE (OUTPATIENT)
Dept: PHARMACY | Age: 67
End: 2022-11-09

## 2022-12-12 ENCOUNTER — HOSPITAL ENCOUNTER (OUTPATIENT)
Dept: PHARMACY | Age: 67
Setting detail: THERAPIES SERIES
Discharge: HOME | End: 2022-12-12

## 2022-12-12 DIAGNOSIS — I48.0 PAROXYSMAL ATRIAL FIBRILLATION (HCC): Primary | ICD-10-CM

## 2022-12-12 LAB — INR BLD: 4.2

## 2022-12-12 NOTE — PROGRESS NOTES
[address] - SELF-TEST Encounter    Patient Findings       Positives:  Change in health (HAS COLD)    Negatives:  Signs/symptoms of thrombosis, Signs/symptoms of bleeding, Laboratory test error suspected, Change in alcohol use, Change in activity, Upcoming invasive procedure, Emergency department visit, Upcoming dental procedure, Missed doses, Extra doses, Change in medications, Change in diet/appetite, Hospital admission, Bruising, Other complaints           Assessment/Plan:  Warfarin Indication:  atrial fibrillation      INR is SUPRAtherapeutic at 4.2, goal is 2-3. CAUSE UNKNOWN, PATIENT RECENTLY ON VACATION, HAS A COLD    Warfarin Dosing Orders: HOLD TODAY AND TOMORROW THEN RETRY SAME DOSE AS PATIENT HAS BEEN STABLE ON THAT DOSE FOR MONTHS     Warfarin Dosing Orders Provided To: PATIENT    Self-Test Follow Up: 1.5 WEEKS    Next [facility] Scheduled Visit: DUE IN MAY      Mich Urrutia [facility] PharmD 12/12/2022 3:09 PM      For Pharmacy Admin Tracking Only    Intervention Detail:   Total # of Interventions Recommended:    Total # of Interventions Accepted:   Time Spent (min): 15

## 2022-12-22 ENCOUNTER — HOSPITAL ENCOUNTER (OUTPATIENT)
Dept: PHARMACY | Age: 67
Setting detail: THERAPIES SERIES
Discharge: HOME | End: 2022-12-22

## 2022-12-22 DIAGNOSIS — I48.0 PAROXYSMAL ATRIAL FIBRILLATION (HCC): Primary | ICD-10-CM

## 2022-12-22 LAB — INR BLD: 1.3

## 2022-12-22 NOTE — PROGRESS NOTES
[address] - SELF-TEST Encounter    Patient Findings       Positives:  Change in medications (TAKING MUCINEX FOR COLD SYMPTOMS)    Negatives:  Signs/symptoms of thrombosis, Signs/symptoms of bleeding, Laboratory test error suspected, Change in health, Change in alcohol use, Change in activity, Upcoming invasive procedure, Emergency department visit, Upcoming dental procedure, Missed doses, Extra doses, Change in diet/appetite, Hospital admission, Bruising, Other complaints           Assessment/Plan:  Warfarin Indication:  atrial fibrillation      INR is SUBtherapeutic at 1.3, goal is 2-3. NO REASON FOR LOW INR; INR WAS 4.2 LAST WEEK. PATIENT STATES METER WORKING PROPERLY.     Warfarin Dosing Orders: same (15 mg every Thur.; 10 mg all other days)    Warfarin Dosing Orders Provided To: patient    Self-Test Follow Up: 12/29    [facility] Scheduled Visit: due in May      Danisha [address] [place] [address] PharmD 12/22/2022 2:17 PM  For Pharmacy Admin Tracking Only    Intervention Detail:   Total # of Interventions Recommended: 0  Total # of Interventions Accepted: 0  Time Spent (min): 10

## 2022-12-29 ENCOUNTER — HOSPITAL ENCOUNTER (OUTPATIENT)
Dept: PHARMACY | Age: 67
Setting detail: THERAPIES SERIES
Discharge: HOME | End: 2022-12-29

## 2022-12-29 DIAGNOSIS — I48.0 PAROXYSMAL ATRIAL FIBRILLATION (HCC): Primary | ICD-10-CM

## 2022-12-29 LAB — INR BLD: 2.4

## 2022-12-29 NOTE — PROGRESS NOTES
[id number] [facility]ticoagulation Clinic - SELF-TEST Encounter    Patient Findings       Negatives:  Signs/symptoms of thrombosis, Signs/symptoms of bleeding, Laboratory test error suspected, Change in health, Change in alcohol use, Change in activity, Upcoming invasive procedure, Emergency department visit, Upcoming dental procedure, Missed doses, Extra doses, Change in medications, Change in diet/appetite, Hospital admission, Bruising, Other complaints           Assessment/Plan:  Warfarin Indication:  atrial fibrillation      INR is therapeutic at 2.4, goal is 2-3.     Warfarin Dosing Orders: same (15 mg every Thur.; 10 mg all other days)    Warfarin Dosing Orders Provided To: patient    Self-Test Follow Up: 2 weeks on 1/12/23    [facility] Scheduled Visit: due in May      Danisha [address] [place] [address] PharmD 12/29/2022 10:44 AM    For Pharmacy Admin Tracking Only    Intervention Detail:   Total # of Interventions Recommended: 0  Total # of Interventions Accepted: 0  Time Spent (min): 15

## 2023-01-04 RX ORDER — WARFARIN SODIUM 10 MG/1
TABLET ORAL
Qty: 120 TABLET | Refills: 3 | Status: SHIPPED | OUTPATIENT
Start: 2023-01-04

## 2023-01-12 ENCOUNTER — HOSPITAL ENCOUNTER (OUTPATIENT)
Dept: PHARMACY | Age: 68
Setting detail: THERAPIES SERIES
Discharge: HOME | End: 2023-01-12

## 2023-01-12 DIAGNOSIS — I48.0 PAROXYSMAL ATRIAL FIBRILLATION (HCC): Primary | ICD-10-CM

## 2023-01-12 LAB — INR BLD: 2.1

## 2023-01-12 NOTE — PROGRESS NOTES
[id number] [facility]ticoagulation Clinic - SELF-TEST Encounter    Patient Findings       Positives:  Change in health (PATIENT STATES HE DEVELOPED A RASH ALL OVER HIS BODY A COUPLE DAYS AFTER STARTING AMOXICILLIN. HE HASN'T HAD THIS MED IN OVER 30 YEARS. I TOLD HIM HE MAY BE ALLERGIC AND NOTED THIS IN EPIC. ALSO ADVISED HIM TO NOTIFY PCP.), Change in medications (TAKING AMOXICILLIN, DAY 9/10 FOR URI)    Negatives:  Signs/symptoms of thrombosis, Signs/symptoms of bleeding, Laboratory test error suspected, Change in alcohol use, Change in activity, Upcoming invasive procedure, Emergency department visit, Upcoming dental procedure, Missed doses, Extra doses, Change in diet/appetite, Hospital admission, Bruising, Other complaints           Assessment/Plan:  Warfarin Indication:  atrial fibrillation      INR is therapeutic at 2.1, goal is 2-3.     Warfarin Dosing Orders: same (15 mg every Thur.; 10 mg all other days)    Warfarin Dosing Orders Provided To: patient    Self-Test Follow Up: 4 weeks on 2/9    [facility] Scheduled Visit: due in May      Danisha [address] [place] [address] PharmD 1/12/2023 4:30 PM      For Pharmacy Admin Tracking Only    Intervention Detail:   Total # of Interventions Recommended: 0  Total # of Interventions Accepted: 0  Time Spent (min): 10

## 2023-02-09 ENCOUNTER — HOSPITAL ENCOUNTER (OUTPATIENT)
Dept: PHARMACY | Age: 68
Setting detail: THERAPIES SERIES
Discharge: HOME | End: 2023-02-09

## 2023-02-09 DIAGNOSIS — I48.0 PAROXYSMAL ATRIAL FIBRILLATION (HCC): Primary | ICD-10-CM

## 2023-02-09 LAB — INR BLD: 2.8

## 2023-02-09 NOTE — PROGRESS NOTES
[id number] [facility]ticoagulation Clinic - SELF-TEST Encounter    Patient Findings       Negatives:  Signs/symptoms of thrombosis, Signs/symptoms of bleeding, Laboratory test error suspected, Change in health, Change in alcohol use, Change in activity, Upcoming invasive procedure, Emergency department visit, Upcoming dental procedure, Missed doses, Extra doses, Change in medications, Change in diet/appetite, Hospital admission, Bruising, Other complaints    Comments:  2/17: PCP           Assessment/Plan:  Warfarin Indication:  atrial fibrillation      INR is therapeutic at 2.8, goal is 2-3.     Warfarin Dosing Orders: same, 15 mg every Thur.; 10 mg all other days    Warfarin Dosing Orders Provided To: patient     Self-Test Follow Up: 4 weeks on 3/9    [facility] Clinic Scheduled Visit: due in May      Danisha [address] [place] [address] PharmD 2/9/2023 1:30 PM    For Pharmacy Admin Tracking Only    Intervention Detail:   Total # of Interventions Recommended: 0  Total # of Interventions Accepted: 0  Time Spent (min): 15

## 2023-03-09 ENCOUNTER — HOSPITAL ENCOUNTER (OUTPATIENT)
Dept: PHARMACY | Age: 68
Setting detail: THERAPIES SERIES
Discharge: HOME | End: 2023-03-09

## 2023-03-09 DIAGNOSIS — I48.0 PAROXYSMAL ATRIAL FIBRILLATION (HCC): Primary | ICD-10-CM

## 2023-03-09 LAB — INR BLD: 2.6

## 2023-03-09 NOTE — PROGRESS NOTES
[id number] [facility] Anticoagulation Clinic - SELF-TEST Encounter    Patient Findings       Negatives:  Signs/symptoms of thrombosis, Signs/symptoms of bleeding, Laboratory test error suspected, Change in health, Change in alcohol use, Change in activity, Upcoming invasive procedure, Emergency department visit, Upcoming dental procedure, Missed doses, Extra doses, Change in medications, Change in diet/appetite, Hospital admission, Bruising, Other complaints    Comments:   [facility] 3/13  DR. RADER 4/6              Assessment/Plan:  Warfarin Indication:  atrial fibrillation       INR is therapeutic at 2.6, goal is 2-3.      Warfarin Dosing Orders: same, 15 mg every Thur.; 10 mg all other days     Warfarin Dosing Orders Provided To: patient      Self-Test Follow Up: 4 weeks on 4/6     [facility] Clinic Scheduled Visit: due in May      Danisha [address] [place] [address] PharmD 3/9/2023 1:47 PM    For Pharmacy [id number]  Only    Intervention Detail:   Total # of Interventions Recommended: 0  Total # of Interventions Accepted: 0  Time Spent (min): 10

## 2023-04-06 ENCOUNTER — HOSPITAL ENCOUNTER (OUTPATIENT)
Dept: PHARMACY | Age: 68
Setting detail: THERAPIES SERIES
Discharge: HOME | End: 2023-04-06

## 2023-04-06 ENCOUNTER — OFFICE VISIT (OUTPATIENT)
Dept: CARDIOLOGY CLINIC | Age: 68
End: 2023-04-06

## 2023-04-06 VITALS
WEIGHT: 269 LBS | HEIGHT: 76 IN | BODY MASS INDEX: 32.76 KG/M2 | SYSTOLIC BLOOD PRESSURE: 132 MMHG | DIASTOLIC BLOOD PRESSURE: 82 MMHG | HEART RATE: 72 BPM | RESPIRATION RATE: 16 BRPM

## 2023-04-06 DIAGNOSIS — I48.0 PAROXYSMAL ATRIAL FIBRILLATION (HCC): Primary | ICD-10-CM

## 2023-04-06 LAB — INR BLD: 3.3

## 2023-04-06 NOTE — PROGRESS NOTES
Intimate Partner Violence: Not on file   Housing Stability: Not on file       No family history on file. Review of Systems:  Heart: as above   Lungs: as above   Eyes: denies changes in vision or discharge. Ears: denies changes in hearing or pain. Nose: denies epistaxis or masses   Throat: denies sore throat or trouble swallowing. Neuro: denies numbness, tingling, tremors. Skin: denies rashes or itching. : denies hematuria, dysuria   GI: denies vomiting, diarrhea   Psych: denies mood changed, anxiety, depression. Physical Exam   /82   Pulse 72   Resp 16   Ht 6' 4\" (1.93 m)   Wt 269 lb (122 kg)   BMI 32.74 kg/m²   Constitutional: Oriented to person, place, and time. Well-developed and well-nourished. No distress. Head: Normocephalic and atraumatic. Eyes: EOM are normal. Pupils are equal, round, and reactive to light. Neck: Normal range of motion. Neck supple. No hepatojugular reflux and no JVD present. Carotid bruit is not present. No tracheal deviation present. No thyromegaly present. Cardiovascular: Normal rate, regular rhythm, normal heart sounds and intact distal pulses. Exam reveals no gallop and no friction rub. No murmur heard. Pulmonary/Chest: Effort normal and breath sounds normal. No respiratory distress. No wheezes. No rales. No tenderness. Abdominal: Soft. Bowel sounds are normal. No distension and no mass. No tenderness. No rebound and no guarding. Musculoskeletal: Normal range of motion. No edema and no tenderness. Lymphadenopathy:   No cervical adenopathy. No groin adenopathy. Neurological: Alert and oriented to person, place, and time. Skin: Skin is warm and dry. No rash noted. Not diaphoretic. No erythema. Psychiatric: Normal mood and affect. Behavior is normal.     EKG: atrial flutter, nonspecific ST and T waves changes.     ASSESSMENT AND PLAN:  Patient Active Problem List   Diagnosis    Atrial fibrillation (HCC)    CAD (coronary artery disease)

## 2023-04-06 NOTE — PROGRESS NOTES
[id number] [facility]ticoagulation Clinic - SELF-TEST Encounter    Patient Findings       Negatives:  Signs/symptoms of thrombosis, Signs/symptoms of bleeding, Laboratory test error suspected, Change in health, Change in alcohol use, Change in activity, Upcoming invasive procedure, Emergency department visit, Upcoming dental procedure, Missed doses, Extra doses, Change in medications, Change in diet/appetite, Hospital admission, Bruising, Other complaints           Assessment/Plan:  Warfarin Indication:  atrial fibrillation      INR is SUPRAtherapeutic at 3.3, goal is 2-3. NO REASON FOR HIGH INR, HAS BEEN VERY STABLE LAST SEVERAL MONTHS.     Warfarin Dosing Orders: HOLD TODAY THEN RESUME SAME REGIMEN, 15 MG EVERY THUR.; 10 MG ALL OTHER DAYS    Warfarin Dosing Orders Provided To: patient    Self-Test Follow Up: 2 weeks on 4/20    [address] Scheduled Visit: due in May      Danisha [address] [place] [address] PharmD 4/6/2023 8:31 AM      Intervention Detail: Dose Adjustment: 1, reason: Therapy De-escalation  Total # of Interventions Recommended: 1  Total # of Interventions Accepted: 1  Time Spent (min): 10

## 2023-04-20 ENCOUNTER — HOSPITAL ENCOUNTER (OUTPATIENT)
Dept: PHARMACY | Age: 68
Setting detail: THERAPIES SERIES
Discharge: HOME | End: 2023-04-20

## 2023-04-20 DIAGNOSIS — I48.0 PAROXYSMAL ATRIAL FIBRILLATION (HCC): Primary | ICD-10-CM

## 2023-04-20 LAB — INR BLD: 2.9

## 2023-04-20 NOTE — PROGRESS NOTES
[id number] [facility] Anticoagulation Clinic - SELF-TEST Encounter    Patient Findings       Negatives:  Signs/symptoms of thrombosis, Signs/symptoms of bleeding, Laboratory test error suspected, Change in health, Change in alcohol use, Change in activity, Upcoming invasive procedure, Emergency department visit, Upcoming dental procedure, Missed doses, Extra doses, Change in medications, Change in diet/appetite, Hospital admission, Bruising, Other complaints    Comments:   [facility] IN MAY           Assessment/Plan:  Warfarin Indication:  atrial fibrillation      INR is therapeutic at 2.9, goal is 2-3.     Warfarin Dosing Orders: same (15 mg Thur.; 10 mg all other days)    Warfarin Dosing Orders Provided To: pt.    Self-Test Follow Up: 5/22    [facility] Scheduled Visit: 5/22      MARY Tejada [facility] PharmD 4/20/2023 11:44 AM    For Pharmacy Admin Tracking Only    Intervention Detail:   Total # of Interventions Recommended: 0  Total # of Interventions Accepted: 0  Time Spent (min): 10

## 2023-05-24 ENCOUNTER — HOSPITAL ENCOUNTER (OUTPATIENT)
Dept: PHARMACY | Age: 68
Setting detail: THERAPIES SERIES
Discharge: HOME | End: 2023-05-24
Payer: MEDICARE

## 2023-05-24 VITALS
DIASTOLIC BLOOD PRESSURE: 80 MMHG | WEIGHT: 267.6 LBS | HEART RATE: 64 BPM | BODY MASS INDEX: 32.57 KG/M2 | SYSTOLIC BLOOD PRESSURE: 147 MMHG

## 2023-05-24 DIAGNOSIS — I48.0 PAROXYSMAL ATRIAL FIBRILLATION (HCC): Primary | ICD-10-CM

## 2023-05-24 LAB — INTERNATIONAL NORMALIZATION RATIO, POC: 3

## 2023-05-24 PROCEDURE — 85610 PROTHROMBIN TIME: CPT

## 2023-05-24 PROCEDURE — 99211 OFF/OP EST MAY X REQ PHY/QHP: CPT

## 2023-05-24 NOTE — PROGRESS NOTES
[facility] [address]  Patient Findings       Positives:  Change in health (WORSENING DIABETIC NEUROPATHY, MAY STOP DRIVING), Change in medications (APPYLING A NEW CREAM FOR NEUROPATHY, HE WILL CALL), Change in diet/appetite (DOING WEIGHT WATCHERS DIET FOR PAST MONTH)    Negatives:  Signs/symptoms of thrombosis, Signs/symptoms of bleeding, Laboratory test error suspected, Change in alcohol use, Change in activity, Upcoming invasive procedure, Emergency department visit, Upcoming dental procedure, Missed doses, Extra doses, Hospital admission, Bruising, Other complaints    Comments:  DR. Elizabeth Wakefield IN AUG. Assessment/Plan:  Warfarin indication: atrial fibrillation      INR today is therapeutic at 3, goal is 2-3. PATIENT'S METER IN OFFICE WAS 2.9.      Warfarin Dose: same (15 mg every Thur.; 10 mg all other days)    Follow Up: 4 weeks - @ home      Lory Head [facility] PharmD 5/24/2023 1:13 PM  For Pharmacy Admin Tracking Only    Intervention Detail:   Total # of Interventions Recommended: 0  Total # of Interventions Accepted: 0  Time Spent (min): 15

## 2023-06-21 ENCOUNTER — HOSPITAL ENCOUNTER (OUTPATIENT)
Dept: PHARMACY | Age: 68
Setting detail: THERAPIES SERIES
Discharge: HOME | End: 2023-06-21

## 2023-06-21 DIAGNOSIS — I48.0 PAROXYSMAL ATRIAL FIBRILLATION (HCC): Primary | ICD-10-CM

## 2023-06-21 LAB — INR BLD: 2.6

## 2023-06-21 NOTE — PROGRESS NOTES
[address] - SELF-TEST Encounter    Patient Findings       Positives:  Change in medications (HE STOPPED FOLTANX; USING LIDOCAINE/GABAPENTIN CREAM TID)    Negatives:  Signs/symptoms of thrombosis, Signs/symptoms of bleeding, Laboratory test error suspected, Change in health, Change in alcohol use, Change in activity, Upcoming invasive procedure, Emergency department visit, Upcoming dental procedure, Missed doses, Extra doses, Change in diet/appetite, Hospital admission, Bruising, Other complaints    Comments:  DR. Vane Eugene IN AUG. Assessment/Plan:  Warfarin Indication:  atrial fibrillation       INR is therapeutic at 2.6, goal is 2-3.      Warfarin Dosing Orders: same (15 mg Thur.; 10 mg all other days)     Warfarin Dosing Orders Provided To: pt.     Self-Test Follow Up: 7/19     [facility] Scheduled Visit: due in NovMARY Bishop [facility] PharmD 6/21/2023 3:42 PM  For Pharmacy Admin Tracking Only    Intervention Detail:   Total # of Interventions Recommended: 0  Total # of Interventions Accepted: 0  Time Spent (min): 10

## 2023-07-19 ENCOUNTER — HOSPITAL ENCOUNTER (OUTPATIENT)
Dept: PHARMACY | Age: 68
Setting detail: THERAPIES SERIES
Discharge: HOME | End: 2023-07-19

## 2023-07-19 DIAGNOSIS — I48.0 PAROXYSMAL ATRIAL FIBRILLATION (HCC): Primary | ICD-10-CM

## 2023-07-19 LAB — INR BLD: 2.9

## 2023-07-19 NOTE — PROGRESS NOTES
[id number] [facility] Anticoagulation Clinic - SELF-TEST Encounter    Patient Findings       Negatives:  Signs/symptoms of thrombosis, Signs/symptoms of bleeding, Laboratory test error suspected, Change in health, Change in alcohol use, Change in activity, Upcoming invasive procedure, Emergency department visit, Upcoming dental procedure, Missed doses, Extra doses, Change in medications, Change in diet/appetite, Hospital admission, Bruising, Other complaints    Comments:  DR. Yang Reyna END OF AUG. Assessment/Plan:  Warfarin Indication:  atrial fibrillation       INR is therapeutic at 2.9, goal is 2-3.      Warfarin Dosing Orders: same (15 mg Thur.; 10 mg all other days)     Warfarin Dosing Orders Provided To: pt.     Self-Test Follow Up: 8/16     [facility] Scheduled Visit: due in Nov.      Ashley Mcintosh [facility] PharmD 7/19/2023 2:01 PM      For Pharmacy Admin Tracking Only    Intervention Detail:   Total # of Interventions Recommended: 0  Total # of Interventions Accepted: 0  Time Spent (min): 10

## 2023-08-16 ENCOUNTER — HOSPITAL ENCOUNTER (OUTPATIENT)
Dept: PHARMACY | Age: 68
Setting detail: THERAPIES SERIES
Discharge: HOME | End: 2023-08-16

## 2023-08-16 DIAGNOSIS — I48.0 PAROXYSMAL ATRIAL FIBRILLATION (HCC): Primary | ICD-10-CM

## 2023-08-16 NOTE — PROGRESS NOTES
[id number] [facility] Anticoagulation Clinic - SELF-TEST Encounter    Patient Findings       Positives:  Change in health (FEELING WEAK AND RUN DOWN SINCE FRIDAY)    Negatives:  Signs/symptoms of thrombosis, Signs/symptoms of bleeding, Laboratory test error suspected, Change in alcohol use, Change in activity, Upcoming invasive procedure, Emergency department visit, Upcoming dental procedure, Missed doses, Extra doses, Change in medications, Change in diet/appetite, Hospital admission, Bruising, Other complaints    Comments:  AUGUST - PCP           Assessment/Plan:  Warfarin Indication: atrial fibrillation       INR is SUPRAtherapeutic at 3.7, goal is 2.-3    Warfarin Dosing Orders: HOLD TODAY THEN RETRY SAME DOSE 15 mg Thur.; 10 mg all other days)    Warfarin Dosing Orders Provided To: Pt    Self-Test Follow Up: 8/23/23    [facility] Scheduled Visit: Due in November      Saman Segura [facility] PharmD 8/16/2023 11:28 AM    For Pharmacy Admin Tracking Only    Intervention Detail:   Total # of Interventions Recommended:    Total # of Interventions Accepted:   Time Spent (min): 15

## 2023-08-17 DIAGNOSIS — I48.0 PAROXYSMAL ATRIAL FIBRILLATION (HCC): Primary | ICD-10-CM

## 2023-08-23 ENCOUNTER — HOSPITAL ENCOUNTER (OUTPATIENT)
Dept: PHARMACY | Age: 68
Setting detail: THERAPIES SERIES
Discharge: HOME | End: 2023-08-23

## 2023-08-23 DIAGNOSIS — I48.0 PAROXYSMAL ATRIAL FIBRILLATION (HCC): Primary | ICD-10-CM

## 2023-08-23 LAB — INR BLD: 1.5

## 2023-08-23 NOTE — PROGRESS NOTES
[address] (Medication Management)  [address] [facility] - [facility], [place], [address]  Phone: [phone]    Self-Test Encounter    Patient Findings       Positives:  Change in medications (STARTED A ZPAK ON 8/21 FOR URI)    Negatives:  Signs/symptoms of thrombosis, Signs/symptoms of bleeding, Laboratory test error suspected, Change in health, Change in alcohol use, Change in activity, Upcoming invasive procedure, Emergency department visit, Upcoming dental procedure, Missed doses, Extra doses, Change in diet/appetite, Hospital admission, Bruising, Other complaints           Assessment/Plan:  Warfarin Indication:  atrial fibrillation      INR is SUBtherapeutic at 1.5, goal is 2-3. HE WAS INSTRUCTED TO HOLD WARFARIN ONE DAY A WEEK AGO, BUT HELD 2 DAYS IN A ROW. HE IS ON A ZPAK WHICH HAS THE POTENTIAL TO INCREASE INR SO I WILL NOT CHANGE HIS DOSE.      Warfarin Dosing Orders: same (15 mg every Thur.; 10 mg all other days)    Warfarin Dosing Orders Provided To: patient    Self-Test Follow Up: 1 week on 8/30    Next [address] Scheduled Visit: due in Nov.      MARY Kaur [facility] [place] PharmD 8/23/2023 1:36 PM    For Pharmacy Admin Tracking Only    Intervention Detail:   Total # of Interventions Recommended: 0  Total # of Interventions Accepted: 0  Time Spent (min): 15

## 2023-08-30 ENCOUNTER — HOSPITAL ENCOUNTER (OUTPATIENT)
Dept: PHARMACY | Age: 68
Setting detail: THERAPIES SERIES
Discharge: HOME | End: 2023-08-30

## 2023-08-30 DIAGNOSIS — I48.0 PAROXYSMAL ATRIAL FIBRILLATION (HCC): Primary | ICD-10-CM

## 2023-08-30 LAB — INR BLD: 2.4

## 2023-08-30 NOTE — PROGRESS NOTES
[address] (Medication Management)  [address] [place], [place], [address]  Phone: [phone]    Self-Test Encounter    Patient Findings       Positives:  Change in health (HE SAW DR. Isabella Barnes, HEALTH IS IMPROVING SINCE RECENT INFECTION)    Negatives:  Signs/symptoms of thrombosis, Signs/symptoms of bleeding, Laboratory test error suspected, Change in alcohol use, Change in activity, Upcoming invasive procedure, Emergency department visit, Upcoming dental procedure, Missed doses, Extra doses, Change in medications, Change in diet/appetite, Hospital admission, Bruising, Other complaints           Assessment/Plan:  Warfarin Indication:  atrial fibrillation       INR is therapeutic at 2.4, goal is 2-3. Warfarin Dosing Orders: same (15 mg Thur.; 10 mg all other days)     Warfarin Dosing Orders Provided To: pt.     Self-Test Follow Up: 4 weeks on 9/27     Next [address] Scheduled Visit: due in Nov.    Patient was asking about switching to a DOAC. I suggested calling his insurance company first to determine cost then asking his cardiologist.  I told him to notify me and I can help him transition from warfarin to [address]      Luis Ball [facility] PharmD 8/30/2023 3:53 PM    For Pharmacy Admin Tracking Only    Intervention Detail:   Total # of Interventions Recommended: 0  Total # of Interventions Accepted: 0  Time Spent (min): 10

## 2023-09-22 ENCOUNTER — TELEPHONE (OUTPATIENT)
Dept: PHARMACY | Age: 68
End: 2023-09-22

## 2023-09-22 RX ORDER — PROPRANOLOL HYDROCHLORIDE 20 MG/1
20 TABLET ORAL EVERY 8 HOURS
COMMUNITY
Start: 2023-09-18

## 2023-09-22 NOTE — TELEPHONE ENCOUNTER
Patient called to give update on new medication. He is taking propranolol for essential tremors. Rx:  Propranolol 20 mg tablets: Take 1 tab by mouth every 8 hours. Has # 90 tablets. Patient will follow up with Dr. Alisa Edward in 3 weeks. He is scheduled to check INR at home on Wednesday 9/27. Please advise of any warfarin dosing or scheduling changes. Thank you.     Electronically signed by Doris Jules on 9/22/23 at 9:40 AM EDT

## 2023-09-22 NOTE — TELEPHONE ENCOUNTER
No change to warfarin dose or appointment needed.    Thank you,   Jurgen Georges [facility], PharmD 9/22/2023 10:01 AM

## 2023-09-27 ENCOUNTER — HOSPITAL ENCOUNTER (OUTPATIENT)
Dept: PHARMACY | Age: 68
Setting detail: THERAPIES SERIES
Discharge: HOME | End: 2023-09-27

## 2023-09-27 DIAGNOSIS — I48.0 PAROXYSMAL ATRIAL FIBRILLATION (HCC): Primary | ICD-10-CM

## 2023-09-27 LAB — INR BLD: 3.5

## 2023-09-27 NOTE — PROGRESS NOTES
[address] (Medication Management)  [address], [place], [address]  Phone: [phone]    Self-Test Encounter    Patient Findings       Negatives:  Signs/symptoms of thrombosis, Signs/symptoms of bleeding, Laboratory test error suspected, Change in health, Change in alcohol use, Change in activity, Upcoming invasive procedure, Emergency department visit, Upcoming dental procedure, Missed doses, Extra doses, Change in medications, Change in diet/appetite, Hospital admission, Bruising, Other complaints           Assessment/Plan:  Warfarin Indication:  atrial fibrillation      INR is SUPRAtherapeutic at 3.5, goal is 2-3. NO REASON FOR HIGH INR.     Warfarin Dosing Orders: HOLD TODAY THEN RESUME SAME REGIMEN (15 MG EVERY THUR.; 10 MG ALL OTHER DAYS    Warfarin Dosing Orders Provided To: patient    Self-Test Follow Up: 2 weeks on 10/11 (can check q1-4 weeks with meter contract)    Next [facility] Scheduled Visit: due in Nov.      Brian Marc, [facility] [facility] PharmD 9/27/2023 9:11 AM    For Pharmacy Admin Tracking Only    Intervention Detail: Dose Adjustment: 1, reason: Therapy De-escalation  Total # of Interventions Recommended: 1  Total # of Interventions Accepted: 1  Time Spent (min): 10

## 2023-10-11 ENCOUNTER — HOSPITAL ENCOUNTER (OUTPATIENT)
Dept: PHARMACY | Age: 68
Setting detail: THERAPIES SERIES
Discharge: HOME | End: 2023-10-11

## 2023-10-11 DIAGNOSIS — I48.0 PAROXYSMAL ATRIAL FIBRILLATION (HCC): Primary | ICD-10-CM

## 2023-10-11 LAB — INR BLD: 2.6

## 2023-10-11 NOTE — PROGRESS NOTES
[address] (Medication Management)  [address] [place], [place], [address]  Phone: [phone]    Self-Test Encounter    Patient Findings       Negatives:  Signs/symptoms of thrombosis, Signs/symptoms of bleeding, Laboratory test error suspected, Change in health, Change in alcohol use, Change in activity, Upcoming invasive procedure, Emergency department visit, Upcoming dental procedure, Missed doses, Extra doses, Change in medications, Change in diet/appetite, Hospital admission, Bruising, Other complaints    Comments:  DR. Jesús Joya LATER TODAY           Assessment/Plan:  Warfarin Indication:  atrial fibrillation      INR is therapeutic at 2.6, goal is 2-3.     Warfarin Dosing Orders: same (15 mg every Thur.; 10 mg all other days)    Warfarin Dosing Orders Provided To: patient    Self-Test Follow Up: 4 weeks on 11/8 (can check q1-4 weeks with meter contract)    [facility] Scheduled Visit: 4 weeks on 11/8      MARY Chaparro [facility] PharmD 10/11/2023 8:29 AM    For Pharmacy [id number] [employer] Only    Intervention Detail:   Total # of Interventions Recommended: 0  Total # of Interventions Accepted: 0  Time Spent (min): 15

## 2023-11-08 ENCOUNTER — HOSPITAL ENCOUNTER (OUTPATIENT)
Dept: PHARMACY | Age: 68
Setting detail: THERAPIES SERIES
Discharge: HOME | End: 2023-11-08
Payer: MEDICARE

## 2023-11-08 VITALS
DIASTOLIC BLOOD PRESSURE: 93 MMHG | WEIGHT: 262 LBS | SYSTOLIC BLOOD PRESSURE: 168 MMHG | BODY MASS INDEX: 31.89 KG/M2 | HEART RATE: 58 BPM

## 2023-11-08 DIAGNOSIS — I48.92 ATRIAL FLUTTER, UNSPECIFIED TYPE (HCC): ICD-10-CM

## 2023-11-08 DIAGNOSIS — I48.0 PAROXYSMAL ATRIAL FIBRILLATION (HCC): Primary | ICD-10-CM

## 2023-11-08 LAB
INR BLD: 3.7
INTERNATIONAL NORMALIZATION RATIO, POC: 3.7

## 2023-11-08 PROCEDURE — 85610 PROTHROMBIN TIME: CPT

## 2023-11-08 PROCEDURE — 99212 OFFICE O/P EST SF 10 MIN: CPT

## 2023-11-08 NOTE — PROGRESS NOTES
[address] (Medication Management)  [address] [place], [place], [address]  Phone: [phone]    Face-To-Face Visit  Patient Findings       Negatives:  Signs/symptoms of thrombosis, Signs/symptoms of bleeding, Laboratory test error suspected, Change in health, Change in alcohol use, Change in activity, Upcoming invasive procedure, Emergency department visit, Upcoming dental procedure, Missed doses, Extra doses, Change in medications, Change in diet/appetite, Hospital admission, Bruising, Other complaints           Assessment/Plan:  Warfarin indication: atrial flutter      INR today is SUPRAtherapeutic at 3.7, goal is 2-3. NO REASON FOR HIGH INR FOUND AT TODAY'S VISIT, POSSIBLY INCREASED STRESS IN PATIENT'S LIFE THAT MAY BE A CONTRIBUTING FACTOR.     Warfarin Dose: HOLD TODAY THEN RESUME SAME REGIMEN (15 MG EVERY THUR.; 10 MG ALL OTHER DAYS)    Follow Up: 2 weeks - @ [address], [facility] PharmD 11/8/2023 1:04 PM    For Pharmacy Admin Tracking Only    Intervention Detail: Dose Adjustment: 1, reason: Therapy De-escalation  Total # of Interventions Recommended: 1  Total # of Interventions Accepted: 1  Time Spent (min): 15

## 2023-11-13 RX ORDER — WARFARIN SODIUM 10 MG/1
TABLET ORAL
Qty: 120 TABLET | Refills: 3 | Status: SHIPPED | OUTPATIENT
Start: 2023-11-13

## 2023-11-20 RX ORDER — WARFARIN SODIUM 10 MG/1
TABLET ORAL
Qty: 135 TABLET | Refills: 3 | OUTPATIENT
Start: 2023-11-20

## 2023-11-22 ENCOUNTER — HOSPITAL ENCOUNTER (OUTPATIENT)
Dept: PHARMACY | Age: 68
Setting detail: THERAPIES SERIES
Discharge: HOME | End: 2023-11-22
Payer: MEDICARE

## 2023-11-22 DIAGNOSIS — I48.92 ATRIAL FLUTTER, UNSPECIFIED TYPE (HCC): Primary | ICD-10-CM

## 2023-11-22 LAB — INR BLD: 3.3

## 2023-11-22 NOTE — PROGRESS NOTES
[address] (Medication Management)  [address] [place], [place], [address]  Phone: [phone]    Self-Test Encounter    Patient Findings       Negatives:  Signs/symptoms of thrombosis, Signs/symptoms of bleeding, Laboratory test error suspected, Change in health, Change in alcohol use, Change in activity, Upcoming invasive procedure, Emergency department visit, Upcoming dental procedure, Missed doses, Extra doses, Change in medications, Change in diet/appetite, Hospital admission, Bruising, Other complaints           Assessment/Plan:  Warfarin Indication:  atrial flutter      INR is SUPRAtherapeutic at 3.3, goal is 2-3. NO REASON FOUND FOR ELEVATED INR. Warfarin Dosing Orders:  HOLD TODAY THEN DECREASE WEEKLY REGIMEN BY 6.7%.  BEGIN TAKING 10 MG DAILY    Warfarin Dosing Orders Provided To: patient     Self-Test Follow Up: 2 weeks- every 1-4 weeks per meter contract    Next [facility] Scheduled Visit: due in May      [address] PharmD Candidate 11/22/2023 11:47 AM    For Pharmacy Admin Tracking Only    Intervention Detail: Dose Adjustment: 1, reason: Therapy De-escalation  Total # of Interventions Recommended: 1  Total # of Interventions Accepted: 1  Time Spent (min): 15

## 2023-12-06 ENCOUNTER — HOSPITAL ENCOUNTER (OUTPATIENT)
Dept: PHARMACY | Age: 68
Setting detail: THERAPIES SERIES
Discharge: HOME | End: 2023-12-06

## 2023-12-06 DIAGNOSIS — I48.92 ATRIAL FLUTTER, UNSPECIFIED TYPE (HCC): Primary | ICD-10-CM

## 2023-12-06 LAB — INR BLD: 2.7

## 2023-12-06 NOTE — PROGRESS NOTES
[address] (Medication Management)  [address] [facility] - [facility], [facility], [address]  Phone: [phone]    Self-Test Encounter       Assessment/Plan:  Warfarin Indication:  atrial flutter      INR is therapeutic at 2.7, goal is 2-3.     Warfarin Dosing Orders: same (10 mg daily)    Warfarin Dosing Orders Provided To: left  @ [phone]    Self-Test Follow Up: 2 weeks on 12/20 (every 1-4 weeks per meter contract)    [facility] Scheduled Visit: due in May      Cesia Riley, [address] [facility] PharmD 12/6/2023 10:46 AM    For Pharmacy Admin Tracking Only    Intervention Detail:   Total # of Interventions Recommended: 0  Total # of Interventions Accepted: 0  Time Spent (min): 10

## 2024-01-03 ENCOUNTER — HOSPITAL ENCOUNTER (OUTPATIENT)
Dept: PHARMACY | Age: 69
Setting detail: THERAPIES SERIES
Discharge: HOME | End: 2024-01-03

## 2024-01-03 DIAGNOSIS — I48.92 ATRIAL FLUTTER, UNSPECIFIED TYPE (HCC): Primary | ICD-10-CM

## 2024-01-03 LAB — INR BLD: 3.4

## 2024-01-03 NOTE — PROGRESS NOTES
[facility]ticoagulation Clinic (Medication Management)  [address] [place], [place], [address]  Phone: [phone]    Self-Test Encounter       Assessment/Plan:  Warfarin Indication:  atrial flutter      INR is SUPRAtherapeutic at 3.4, goal is 2-3.     Warfarin Dosing Orders: HOLD TODAY THEN RESUME 10 MG DAILY    Warfarin Dosing Orders Provided To: left VM for pt    Self-Test Follow Up: patient out of town until 1/19, recheck 1/24    Next  Clinic Scheduled Visit: due in May      Danisha Burgess [facility] PharmD, BCACP 1/3/2024 11:36 AM    For Pharmacy Admin Tracking Only    Intervention Detail: Dose Adjustment: 1, reason: Therapy De-escalation  Total # of Interventions Recommended: 1  Total # of Interventions Accepted: 1  Time Spent (min): 10

## 2024-01-24 ENCOUNTER — HOSPITAL ENCOUNTER (OUTPATIENT)
Dept: PHARMACY | Age: 69
Setting detail: THERAPIES SERIES
Discharge: HOME | End: 2024-01-24

## 2024-01-24 DIAGNOSIS — I48.92 ATRIAL FLUTTER, UNSPECIFIED TYPE (HCC): Primary | ICD-10-CM

## 2024-01-24 LAB — INR BLD: 1.7

## 2024-01-24 NOTE — PROGRESS NOTES
[facility] Anticoagulation Clinic (Medication Management)  [address] [place], [place], [address]  Phone: [phone]    Self-Test Encounter    Patient Findings       Positives:  Change in alcohol use (INCREASED WHILE ON VACATION, A FEW DAYS HE HAD > 2 DRINKS), Missed doses (HE MISSED WARFARIN DOSE OF 10 MG ON 1/22), Change in medications (HE TOOK A ZPAK LAST WEEK), Change in diet/appetite (INCREASED VITAMIN K WHILE ON VACATION)    Negatives:  Signs/symptoms of thrombosis, Signs/symptoms of bleeding, Laboratory test error suspected, Change in health, Change in activity, Upcoming invasive procedure, Emergency department visit, Upcoming dental procedure, Extra doses, Hospital admission, Bruising, Other complaints           Assessment/Plan:  Warfarin Indication:  atrial flutter      INR is subtherapeutic at 1.7, goal is 2-3. Patient missed 10 mg warfarin in past week.     Warfarin Dosing Orders: 15 mg today, then resume 10 mg daily    Warfarin Dosing Orders Provided To: patient     Self-Test Follow Up: 2 weeks on 2/7 (every 1-4 weeks per meter contract)     Next  Clinic Scheduled Visit: due in May      Danisha Burgess [facility] PharmD, BCACP 1/24/2024 1:02 PM    For Pharmacy Admin Tracking Only    Intervention Detail: Dose Adjustment: 1, reason: Therapy Optimization  Total # of Interventions Recommended: 1  Total # of Interventions Accepted: 1  Time Spent (min): 15

## 2024-02-07 ENCOUNTER — HOSPITAL ENCOUNTER (OUTPATIENT)
Dept: PHARMACY | Age: 69
Setting detail: THERAPIES SERIES
Discharge: HOME | End: 2024-02-07

## 2024-02-07 DIAGNOSIS — I48.92 ATRIAL FLUTTER, UNSPECIFIED TYPE (HCC): Primary | ICD-10-CM

## 2024-02-07 LAB — INR BLD: 2.6

## 2024-02-07 NOTE — PROGRESS NOTES
[facility] Anticoagulation Clinic (Medication Management)  [address] [place], [place], [address]  Phone: [phone]    Self-Test Encounter    Patient Findings       Positives:  Change in medications (USING ANBESOL GEL)    Negatives:  Signs/symptoms of thrombosis, Signs/symptoms of bleeding, Laboratory test error suspected, Change in health, Change in alcohol use, Change in activity, Upcoming invasive procedure, Emergency department visit, Upcoming dental procedure, Missed doses, Extra doses, Change in diet/appetite, Hospital admission, Bruising, Other complaints           Assessment/Plan:  Warfarin Indication:  atrial flutter       INR is therapeutic at 2.6, goal is 2-3.       Warfarin Dosing Orders: same (10 mg daily)     Warfarin Dosing Orders Provided To: patient      Self-Test Follow Up: 4 weeks on 3/6 (every 1-4 weeks per meter contract)      Next  Clinic Scheduled Visit: due in May      Danisha Burgess RPH PharmD, BCACP 2/7/2024 9:41 AM    For Pharmacy Admin Tracking Only    Intervention Detail:   Total # of Interventions Recommended: 0  Total # of Interventions Accepted: 0  Time Spent (min): 10

## 2024-03-06 ENCOUNTER — HOSPITAL ENCOUNTER (OUTPATIENT)
Dept: PHARMACY | Age: 69
Setting detail: THERAPIES SERIES
Discharge: HOME | End: 2024-03-06

## 2024-03-06 DIAGNOSIS — I48.92 ATRIAL FLUTTER, UNSPECIFIED TYPE (HCC): Primary | ICD-10-CM

## 2024-03-06 LAB — INR BLD: 3.4

## 2024-03-06 NOTE — PROGRESS NOTES
[facility] Anticoagulation Clinic (Medication Management)  [address] [place], [place], [address]  Phone: [phone]    Self-Test Encounter    Patient Findings       Positives:  Change in health (He has a dental viral infection and was told to let it heal on its own), Change in medications (He is taking Airborne)    Negatives:  Signs/symptoms of thrombosis, Signs/symptoms of bleeding, Laboratory test error suspected, Change in alcohol use, Change in activity, Upcoming invasive procedure, Emergency department visit, Upcoming dental procedure, Missed doses, Extra doses, Change in diet/appetite, Hospital admission, Bruising, Other complaints           Assessment/Plan:  Warfarin Indication:  atrial flutter       INR is supratherapeutic at 3.4, goal is 2-3. No reason for high INR.    Warfarin Dosing Orders: Hold today's dose then resume 10 mg daily    Warfarin Dosing Orders Provided To: patient    Self-Test Follow Up: 3/20, Wed.    POC Meter Contract Minimum Testing Frequency: 1x/month    Next  Clinic Scheduled Visit: due in May      Danisha Burgess [facility] PharmD, BCACP 3/6/2024 2:53 PM    For Pharmacy Admin Tracking Only    Intervention Detail: Dose Adjustment: 1, reason: Therapy De-escalation  Total # of Interventions Recommended: 1  Total # of Interventions Accepted: 1  Time Spent (min): 10

## 2024-03-20 ENCOUNTER — HOSPITAL ENCOUNTER (OUTPATIENT)
Dept: PHARMACY | Age: 69
Setting detail: THERAPIES SERIES
Discharge: HOME | End: 2024-03-20

## 2024-03-20 DIAGNOSIS — I48.92 ATRIAL FLUTTER, UNSPECIFIED TYPE (HCC): Primary | ICD-10-CM

## 2024-03-20 LAB — INR BLD: 2.3

## 2024-03-20 NOTE — PROGRESS NOTES
[facility] Anticoagulation Clinic (Medication Management)  [address] [place], [place], [address]  Phone: [phone]    Self-Test Encounter    Patient Findings       Positives:  Change in medications (Taking amoxicillin for dental infection, will f/u with dentist tomorrow)    Negatives:  Signs/symptoms of thrombosis, Signs/symptoms of bleeding, Laboratory test error suspected, Change in health, Change in alcohol use, Change in activity, Upcoming invasive procedure, Emergency department visit, Upcoming dental procedure, Missed doses, Extra doses, Change in diet/appetite, Hospital admission, Bruising, Other complaints           Assessment/Plan:  Warfarin Indication:  atrial flutter      INR is therapeutic at 2.3, goal is 2-3.    Warfarin Dosing Orders: same (10 mg daily)    Warfarin Dosing Orders Provided To: patient    Self-Test Follow Up: 4 weeks - 4/17    POC Meter Contract Minimum Testing Frequency: 1x/month    Next  Clinic Scheduled Visit: due in May      Danisha Burgess RPH PharmD, BCACP 3/20/2024 8:39 AM    For Pharmacy Admin Tracking Only    Intervention Detail:   Total # of Interventions Recommended: 0  Total # of Interventions Accepted: 0  Time Spent (min): 15

## 2024-04-17 ENCOUNTER — HOSPITAL ENCOUNTER (OUTPATIENT)
Dept: PHARMACY | Age: 69
Setting detail: THERAPIES SERIES
Discharge: HOME | End: 2024-04-17

## 2024-04-17 DIAGNOSIS — I48.92 ATRIAL FLUTTER, UNSPECIFIED TYPE (HCC): Primary | ICD-10-CM

## 2024-04-17 LAB — INR BLD: 3

## 2024-04-17 NOTE — PROGRESS NOTES
[facility] Anticoagulation Clinic (Medication Management)  [address] [place], [address]  Phone: [phone]    Self-Test Encounter    Patient Findings       Positives:  Change in health (He slipped around an indoor pool - prescribed clindamycin yesterday. He had some scrapes on toes and knees.)    Negatives:  Signs/symptoms of thrombosis, Signs/symptoms of bleeding, Laboratory test error suspected, Change in alcohol use, Change in activity, Upcoming invasive procedure, Emergency department visit, Upcoming dental procedure, Missed doses, Extra doses, Change in medications, Change in diet/appetite, Hospital admission, Bruising, Other complaints           Assessment/Plan:  Warfarin Indication:  atrial flutter       INR is therapeutic at 3, goal is 2-3.     Warfarin Dosing Orders: same (10 mg daily)     Warfarin Dosing Orders Provided To: patient     Self-Test Follow Up: 4 weeks      POC Meter Contract Minimum Testing Frequency: 1x/month     Next  Clinic Scheduled Visit: 5/15/24      Danisha Burgess RPH PharmD, BCACP 4/17/2024 8:42 AM    For Pharmacy Admin Tracking Only    Intervention Detail:   Total # of Interventions Recommended: 0  Total # of Interventions Accepted: 0  Time Spent (min): 15

## 2024-04-25 ENCOUNTER — TELEPHONE (OUTPATIENT)
Dept: PHARMACY | Age: 69
End: 2024-04-25

## 2024-04-25 RX ORDER — TIRZEPATIDE 2.5 MG/.5ML
2.5 INJECTION, SOLUTION SUBCUTANEOUS WEEKLY
COMMUNITY
Start: 2024-04-24

## 2024-04-25 NOTE — TELEPHONE ENCOUNTER
Patient called and left message on VM stating his blood sugar was 8.5, so Dr. Benjamin started him on Mounjaro.    Electronically signed by Daylin Marx on 4/25/24 at 1:04 PM EDT

## 2024-05-15 ENCOUNTER — HOSPITAL ENCOUNTER (OUTPATIENT)
Dept: PHARMACY | Age: 69
Setting detail: THERAPIES SERIES
Discharge: HOME | End: 2024-05-15
Payer: MEDICARE

## 2024-05-15 VITALS
BODY MASS INDEX: 31.62 KG/M2 | DIASTOLIC BLOOD PRESSURE: 84 MMHG | WEIGHT: 259.8 LBS | HEART RATE: 60 BPM | SYSTOLIC BLOOD PRESSURE: 167 MMHG

## 2024-05-15 DIAGNOSIS — I48.92 ATRIAL FLUTTER, UNSPECIFIED TYPE (HCC): Primary | ICD-10-CM

## 2024-05-15 LAB
INR BLD: 4
INTERNATIONAL NORMALIZATION RATIO, POC: 4.1

## 2024-05-15 PROCEDURE — 99212 OFFICE O/P EST SF 10 MIN: CPT

## 2024-05-15 PROCEDURE — 85610 PROTHROMBIN TIME: CPT

## 2024-05-15 NOTE — PROGRESS NOTES
[facility] Anticoagulation Clinic (Medication Management)  [address] [place], [address]  Phone: [phone]    Face-To-Face Visit  Patient Findings       Positives:  Change in medications (He started using Mounjaro; off Januvia and acarbose)    Negatives:  Signs/symptoms of thrombosis, Signs/symptoms of bleeding, Laboratory test error suspected, Change in health, Change in alcohol use, Change in activity, Upcoming invasive procedure, Emergency department visit, Upcoming dental procedure, Missed doses, Extra doses, Change in diet/appetite, Hospital admission, Bruising, Other complaints           Assessment/Plan:  Warfarin indication: atrial flutter      INR today is supratherapeutic at 4.1, goal is 2-3. Patient's meter is 4.0 from this morning. The only possible explanation for high INR is recent start of Mounjaro.    Warfarin Dose: Hold today then decrease weekly dose 7% (5 mg every Sun.; 10 mg all other days)    Follow Up: 2 weeks      Danisha Burgess RPH PharmD, BCACP 5/15/2024 1:30 PM    For Pharmacy Admin Tracking Only    Intervention Detail: Dose Adjustment: 1, reason: Therapy De-escalation  Total # of Interventions Recommended: 1  Total # of Interventions Accepted: 1  Time Spent (min): 15

## 2024-05-29 ENCOUNTER — APPOINTMENT (OUTPATIENT)
Dept: PHARMACY | Age: 69
End: 2024-05-29
Payer: MEDICARE

## 2024-05-29 DIAGNOSIS — I48.92 ATRIAL FLUTTER, UNSPECIFIED TYPE (HCC): Primary | ICD-10-CM

## 2024-05-29 LAB — INR BLD: 2.9

## 2024-05-29 RX ORDER — TIRZEPATIDE 5 MG/.5ML
5 INJECTION, SOLUTION SUBCUTANEOUS WEEKLY
COMMUNITY
Start: 2024-05-20

## 2024-05-29 NOTE — PROGRESS NOTES
[facility] Anticoagulation Clinic (Medication Management)  [address] [place], [address]  Phone: [phone]    Self-Test Encounter    Patient Findings       Positives:  Change in medications (Mounjaro increased from 2.5 mg to 5 mg once a week)    Negatives:  Signs/symptoms of thrombosis, Signs/symptoms of bleeding, Laboratory test error suspected, Change in health, Change in alcohol use, Change in activity, Upcoming invasive procedure, Emergency department visit, Upcoming dental procedure, Missed doses, Extra doses, Change in diet/appetite, Hospital admission, Bruising, Other complaints           Assessment/Plan:  Warfarin Indication:  atrial flutter      INR is therapeutic at 2.9, goal is 2-3.    Warfarin Dosing Orders: same (5 mg every Sun.; 10 mg all other days)    Warfarin Dosing Orders Provided To: patient    Self-Test Follow Up (date/day of week): 6/12, Wed.    POC Meter Contract Minimum Testing Frequency: 1x/month    Next  Clinic Scheduled Visit: due in Nov.      Danisha Burgess RPH PharmD, BCACP 5/29/2024 12:01 PM    For Pharmacy Admin Tracking Only    Intervention Detail:   Total # of Interventions Recommended: 0  Total # of Interventions Accepted: 0  Time Spent (min): 10

## 2024-06-12 ENCOUNTER — HOSPITAL ENCOUNTER (OUTPATIENT)
Dept: PHARMACY | Age: 69
Setting detail: THERAPIES SERIES
Discharge: HOME | End: 2024-06-12

## 2024-06-12 DIAGNOSIS — I48.92 ATRIAL FLUTTER, UNSPECIFIED TYPE (HCC): Primary | ICD-10-CM

## 2024-06-12 LAB — INR BLD: 2.4

## 2024-06-12 NOTE — PROGRESS NOTES
[facility] Anticoagulation Clinic (Medication Management)  [address] [place], [place], [address]  Phone: [phone]    Self-Test Encounter       Assessment/Plan:  Warfarin Indication:  atrial flutter       INR is therapeutic at 2.4, goal is 2-3.     Warfarin Dosing Orders: same (5 mg every Sun.; 10 mg all other days)     Warfarin Dosing Orders Provided To: left  for patient     Self-Test Follow Up (date/day of week): 7/10, Wed.     POC Meter Contract Minimum Testing Frequency: 1x/month     Next  Clinic Scheduled Visit: due in Nov.      Danisha Burgess MIRTHA PharmD, BCACP 6/12/2024 10:35 AM    For Pharmacy Admin Tracking Only    Intervention Detail:   Total # of Interventions Recommended: 0  Total # of Interventions Accepted: 0  Time Spent (min): 10

## 2024-06-13 ENCOUNTER — TELEPHONE (OUTPATIENT)
Dept: PHARMACY | Age: 69
End: 2024-06-13

## 2024-06-13 NOTE — TELEPHONE ENCOUNTER
Patient called to say he will be away on vacation the week he is supposed to recheck his INR (scheduled 7/10). Per pharmacist, Brittney Howard, it is ok to have patient check his INR the following week, on Wednesday 7/17.    Electronically signed by Daylin Marx on 6/13/24 at 9:12 AM EDT

## 2024-07-17 ENCOUNTER — HOSPITAL ENCOUNTER (OUTPATIENT)
Dept: PHARMACY | Age: 69
Setting detail: THERAPIES SERIES
Discharge: HOME | End: 2024-07-17

## 2024-07-17 DIAGNOSIS — I48.92 ATRIAL FLUTTER, UNSPECIFIED TYPE (HCC): Primary | ICD-10-CM

## 2024-07-17 LAB
INR BLD: 1.7
PROTIME: NORMAL SECONDS

## 2024-07-17 NOTE — PROGRESS NOTES
[facility] (Medication Management)  [address] [place], [address]  Phone: [phone]    Self-Test Encounter    Patient Findings       Positives:  Signs/symptoms of bleeding (He recently stubbed his toe, it bled a little longer than he would have expected), Change in medications (Mounjaro increased from 5 mg to 7.5 mg daily)    Negatives:  Signs/symptoms of thrombosis, Laboratory test error suspected, Change in health, Change in alcohol use, Change in activity, Upcoming invasive procedure, Emergency department visit, Upcoming dental procedure, Missed doses, Extra doses, Change in diet/appetite, Hospital admission, Bruising, Other complaints           Assessment/Plan:  Warfarin Indication:  atrial flutter      INR is subtherapeutic at 1.7, goal is 2-3. No reason for low INR.    Warfarin Dosing Orders: 15 mg booster dose today then resume same regimen (5 mg every Sunday; 10 mg all other days)    Warfarin Dosing Orders Provided To: patient     Self-Test Follow Up (date/day of week): 7/13, Wed.    POC Meter Contract Minimum Testing Frequency: 1x/month    Next  Clinic Scheduled Visit: due in Nov.      Danisha Burgess, NASRIN PharmD, BCACP 7/17/2024 9:37 AM    For Pharmacy Admin Tracking Only    Intervention Detail: Dose Adjustment: 1, reason: Therapy Optimization  Total # of Interventions Recommended: 1  Total # of Interventions Accepted: 1  Time Spent (min): 10

## 2024-07-29 ENCOUNTER — OFFICE VISIT (OUTPATIENT)
Dept: CARDIOLOGY CLINIC | Age: 69
End: 2024-07-29
Payer: MEDICARE

## 2024-07-29 VITALS
SYSTOLIC BLOOD PRESSURE: 112 MMHG | HEIGHT: 76 IN | WEIGHT: 244.4 LBS | DIASTOLIC BLOOD PRESSURE: 83 MMHG | BODY MASS INDEX: 29.76 KG/M2 | HEART RATE: 64 BPM | RESPIRATION RATE: 16 BRPM

## 2024-07-29 DIAGNOSIS — I48.91 ATRIAL FIBRILLATION, UNSPECIFIED TYPE (HCC): Primary | ICD-10-CM

## 2024-07-29 DIAGNOSIS — I48.0 PAROXYSMAL ATRIAL FIBRILLATION (HCC): ICD-10-CM

## 2024-07-29 DIAGNOSIS — I48.92 ATRIAL FLUTTER, UNSPECIFIED TYPE (HCC): Primary | ICD-10-CM

## 2024-07-29 PROCEDURE — 93000 ELECTROCARDIOGRAM COMPLETE: CPT | Performed by: INTERNAL MEDICINE

## 2024-07-29 PROCEDURE — 1036F TOBACCO NON-USER: CPT | Performed by: INTERNAL MEDICINE

## 2024-07-29 PROCEDURE — 3074F SYST BP LT 130 MM HG: CPT | Performed by: INTERNAL MEDICINE

## 2024-07-29 PROCEDURE — G8417 CALC BMI ABV UP PARAM F/U: HCPCS | Performed by: INTERNAL MEDICINE

## 2024-07-29 PROCEDURE — 3079F DIAST BP 80-89 MM HG: CPT | Performed by: INTERNAL MEDICINE

## 2024-07-29 PROCEDURE — G8427 DOCREV CUR MEDS BY ELIG CLIN: HCPCS | Performed by: INTERNAL MEDICINE

## 2024-07-29 PROCEDURE — 99214 OFFICE O/P EST MOD 30 MIN: CPT | Performed by: INTERNAL MEDICINE

## 2024-07-29 PROCEDURE — 1123F ACP DISCUSS/DSCN MKR DOCD: CPT | Performed by: INTERNAL MEDICINE

## 2024-07-29 PROCEDURE — 3017F COLORECTAL CA SCREEN DOC REV: CPT | Performed by: INTERNAL MEDICINE

## 2024-07-31 ENCOUNTER — HOSPITAL ENCOUNTER (OUTPATIENT)
Dept: PHARMACY | Age: 69
Setting detail: THERAPIES SERIES
Discharge: HOME | End: 2024-07-31

## 2024-07-31 DIAGNOSIS — I48.92 ATRIAL FLUTTER, UNSPECIFIED TYPE (HCC): Primary | ICD-10-CM

## 2024-07-31 LAB
INR BLD: 2.8
PROTIME: NORMAL SECONDS

## 2024-07-31 NOTE — PROGRESS NOTES
[facility]ticoagulation [facility] (Medication Management)  [address] [place], [place], [address]  Phone: [phone]    Self-Test Encounter       Assessment/Plan:  Warfarin Indication:  atrial flutter       INR is therapeutic at 2.8, goal is 2-3.      Warfarin Dosing Orders:  same regimen (5 mg every Sunday; 10 mg all other days)     Warfarin Dosing Orders Provided To:  for patient at [phone], asked if he wants mobile to be primary as it is now marked that way in EPIC      Self-Test Follow Up (date/day of week): 8/28, Wed.     POC Meter Contract Minimum Testing Frequency: 1x/month     Next  Clinic Scheduled Visit: due in Nov.      Danisha Burgess RP PharmD, BCACP 7/31/2024 11:36 AM    For Pharmacy Admin Tracking Only    Intervention Detail:   Total # of Interventions Recommended: 0  Total # of Interventions Accepted: 0  Time Spent (min): 10

## 2024-08-28 ENCOUNTER — ANTI-COAG VISIT (OUTPATIENT)
Age: 69
End: 2024-08-28

## 2024-08-28 DIAGNOSIS — I48.92 ATRIAL FLUTTER, UNSPECIFIED TYPE (HCC): Primary | ICD-10-CM

## 2024-08-28 LAB
INR BLD: 1.6
PROTIME: NORMAL

## 2024-08-28 NOTE — PROGRESS NOTES
[facility]ticoagulation [facility] (Medication Management)  [address] [place], [address]  Phone: [phone]    Self-Test Encounter    Patient Findings       Positives:  Change in diet/appetite (He had stuffed cabbage this week)    Negatives:  Signs/symptoms of thrombosis, Signs/symptoms of bleeding, Laboratory test error suspected, Change in health, Change in alcohol use, Change in activity, Upcoming invasive procedure, Emergency department visit, Upcoming dental procedure, Missed doses, Extra doses, Change in medications, Hospital admission, Bruising, Other complaints    Comments:  PCP visit later today           Assessment/Plan:  Warfarin Indication:  atrial flutter      INR is subtherapeutic at 1.6, goal is 2-3. No reason for low INR other than an increase in vitamin K with stuffed cabbage; Mounjaro dose is still at 7.5 mg per week.    Warfarin Dosing Orders: 15 mg booster dose today, then resume same regimen (5 mg every Sunday; 10 mg all other days)    Warfarin Dosing Orders Provided To: patient    Self-Test Follow Up (date/day of week): Typically checks on Wed but is leaving for a cruise on 9/9 and will return 9/20 so he will check INR on 9/6    POC Meter Contract Minimum Testing Frequency: 1x/month    Next AC Clinic Scheduled Visit: due in Nov.      Danisha Burgess RPH PharmD, BCACP 8/28/2024 11:51 AM    For Pharmacy Admin Tracking Only    Intervention Detail: Dose Adjustment: 1, reason: Therapy Optimization  Total # of Interventions Recommended: 1  Total # of Interventions Accepted: 1  Time Spent (min): 10

## 2024-09-06 ENCOUNTER — ANTI-COAG VISIT (OUTPATIENT)
Age: 69
End: 2024-09-06

## 2024-09-06 DIAGNOSIS — I48.92 ATRIAL FLUTTER, UNSPECIFIED TYPE (HCC): Primary | ICD-10-CM

## 2024-09-06 LAB
INR BLD: 2.3
PROTIME: NORMAL

## 2024-09-06 NOTE — PROGRESS NOTES
[facility] Anticoagulation Clinic (Medication Management)  [address] [place], [address]  Phone: [phone]    Self-Test Encounter    Patient Findings       Negatives:  Signs/symptoms of thrombosis, Signs/symptoms of bleeding, Laboratory test error suspected, Change in health, Change in alcohol use, Change in activity, Upcoming invasive procedure, Emergency department visit, Upcoming dental procedure, Missed doses, Extra doses, Change in medications, Change in diet/appetite, Hospital admission, Bruising, Other complaints           Assessment/Plan:  Warfarin Indication:  atrial flutter      INR is therapeutic at 2.3, goal is 2-3.    Warfarin Dosing Orders: same (5 mg every Sunday; 10 mg all other days)    Warfarin Dosing Orders Provided To: patient     Self-Test Follow Up (date/day of week): 9/25, Wed    POC Meter Contract Minimum Testing Frequency: 1x/month    Next AC Clinic Scheduled Visit: due in Nov.      Danisha Burgess RPH PharmD, BCACP 9/6/2024 11:27 AM    For Pharmacy Admin Tracking Only    Intervention Detail:   Total # of Interventions Recommended: 0  Total # of Interventions Accepted: 0  Time Spent (min): 10

## 2024-09-25 ENCOUNTER — ANTI-COAG VISIT (OUTPATIENT)
Age: 69
End: 2024-09-25

## 2024-09-25 DIAGNOSIS — I48.92 ATRIAL FLUTTER, UNSPECIFIED TYPE (HCC): Primary | ICD-10-CM

## 2024-09-25 LAB
INR BLD: 2.2
PROTIME: NORMAL

## 2024-10-23 ENCOUNTER — ANTI-COAG VISIT (OUTPATIENT)
Age: 69
End: 2024-10-23

## 2024-10-23 DIAGNOSIS — I48.92 ATRIAL FLUTTER, UNSPECIFIED TYPE (HCC): Primary | ICD-10-CM

## 2024-10-23 LAB
INR BLD: 2.3
PROTIME: NORMAL

## 2024-10-23 NOTE — PROGRESS NOTES
[facility] Anticoagulation Clinic (Medication Management)  [address] [place], [address]  Phone: [phone]    Self-Test Encounter    Patient Findings       Negatives:  Signs/symptoms of thrombosis, Signs/symptoms of bleeding, Laboratory test error suspected, Change in health, Change in alcohol use, Change in activity, Upcoming invasive procedure, Emergency department visit, Upcoming dental procedure, Missed doses, Extra doses, Change in medications, Change in diet/appetite, Hospital admission, Bruising, Other complaints           Assessment/Plan:  Warfarin Indication:  atrial flutter       INR is therapeutic at 2.3, goal is 2-3.     Warfarin Dosing Orders: same (5 mg every Sunday; 10 mg all other days)     Warfarin Dosing Orders Provided To: pt     Self-Test Follow Up (date/day of week): 4 weeks, Wed., 11/20     POC Meter Contract Minimum Testing Frequency: 1x/month     Next  Clinic Scheduled Visit: Nov.20, 20224     Danisha Burgess RPH PharmD, BCACP 10/23/2024 2:32 PM    For Pharmacy Admin Tracking Only    Intervention Detail:   Total # of Interventions Recommended: 0  Total # of Interventions Accepted: 0  Time Spent (min): 10

## 2024-11-20 ENCOUNTER — ANTI-COAG VISIT (OUTPATIENT)
Age: 69
End: 2024-11-20
Payer: MEDICARE

## 2024-11-20 VITALS
HEART RATE: 55 BPM | SYSTOLIC BLOOD PRESSURE: 167 MMHG | BODY MASS INDEX: 30.04 KG/M2 | WEIGHT: 246.8 LBS | DIASTOLIC BLOOD PRESSURE: 90 MMHG

## 2024-11-20 DIAGNOSIS — I48.92 ATRIAL FLUTTER, UNSPECIFIED TYPE (HCC): Primary | ICD-10-CM

## 2024-11-20 LAB
INTERNATIONAL NORMALIZATION RATIO, POC: 3.5
PROTHROMBIN TIME, POC: 0

## 2024-11-20 PROCEDURE — 85610 PROTHROMBIN TIME: CPT

## 2024-11-20 PROCEDURE — 99212 OFFICE O/P EST SF 10 MIN: CPT

## 2024-11-20 NOTE — PROGRESS NOTES
[facility] Anticoagulation Clinic (Medication Management)  [address] [place], [address]  Phone: [phone]    Face-To-Face Visit  Patient Findings       Negatives:  Signs/symptoms of thrombosis, Signs/symptoms of bleeding, Laboratory test error suspected, Change in health, Change in alcohol use, Change in activity, Upcoming invasive procedure, Emergency department visit, Upcoming dental procedure, Missed doses, Extra doses, Change in medications, Change in diet/appetite, Hospital admission, Bruising, Other complaints    Comments:  December 12-23 on cruise            Assessment/Plan:  Warfarin indication: atrial flutter      INR today is supratherapeutic at 3.5, goal is 2-3. No reason for elevated INR other than continued weight loss with help of Mounjaro.     Patient's INR today was 3.4 at home with his meter about 6 hours ago.      Warfarin Dose: Decrease 8% - 5 mg every Sun./Wed.; 10 mg all other days    Follow Up:  Recheck INR in 2 week(s) at home.      Danisha Burgess [facility] PharmD, BCACP 11/20/2024 1:42 PM    For Pharmacy Admin Tracking Only    Intervention Detail: Dose Adjustment: 1, reason: Therapy De-escalation  Total # of Interventions Recommended: 1  Total # of Interventions Accepted: 1  Time Spent (min): 15

## 2024-12-04 ENCOUNTER — ANTI-COAG VISIT (OUTPATIENT)
Age: 69
End: 2024-12-04

## 2024-12-04 DIAGNOSIS — I48.92 ATRIAL FLUTTER, UNSPECIFIED TYPE (HCC): Primary | ICD-10-CM

## 2024-12-04 LAB
INR BLD: 3.1
PROTIME: NORMAL

## 2024-12-04 NOTE — PROGRESS NOTES
[facility] Anticoagulation [facility] (Medication Management)  [address] [place], [address]  Phone: [phone]    Self-Test Encounter       Assessment/Plan:  Warfarin Indication:  atrial flutter        INR is supratherapeutic at 3.1, goal is 2-3. Unknown reason for high INR.     Warfarin Dosing Orders: Continue same (5 mg every Sun./Wed.; 10 mg all other days) will re-evaluate dosing changes at next visit.     Warfarin Dosing Orders Provided To: patient    Self-Test Follow Up (date/day of week): 12/11- Wednesday D/T GOING ON A CRUISE FOR 2 WEEKS AFTER AND OFFICE CLOSED    POC Meter Contract Minimum Testing Frequency: 1x/month    Next AC Clinic Scheduled Visit: HERNAN Bolivar RPH, PharmD, BCPS 12/4/2024 5:21 PM   Ext: [phone]     For Pharmacy Admin Tracking Only    Intervention Detail:   Total # of Interventions Recommended: 1  Total # of Interventions Accepted: 1  Time Spent (min): 15

## 2024-12-11 ENCOUNTER — ANTI-COAG VISIT (OUTPATIENT)
Age: 69
End: 2024-12-11

## 2024-12-11 DIAGNOSIS — I48.92 ATRIAL FLUTTER, UNSPECIFIED TYPE (HCC): Primary | ICD-10-CM

## 2024-12-11 LAB
INR BLD: 3
PROTIME: NORMAL

## 2024-12-11 NOTE — PROGRESS NOTES
[facility] Anticoagulation Clinic (Medication Management)  [address] [place], [address]  Phone: [phone]    Self-Test Encounter    Patient Findings       Negatives:  Signs/symptoms of thrombosis, Signs/symptoms of bleeding, Laboratory test error suspected, Change in health, Change in alcohol use, Change in activity, Upcoming invasive procedure, Emergency department visit, Upcoming dental procedure, Missed doses, Extra doses, Change in medications, Change in diet/appetite, Hospital admission, Bruising, Other complaints           Assessment/Plan:  Warfarin Indication:  atrial flutter      INR is therapeutic at 3, goal is 2-3.    Warfarin Dosing Orders: same (5 mg every Sun./Wed.; 10 mg all other days)    Warfarin Dosing Orders Provided To: patient     Self-Test Follow Up (date/day of week): Usually Wed, but will check Thur., 12/26 d/t holiday    POC Meter Contract Minimum Testing Frequency: 1x/month    Next  Clinic Scheduled Visit: due in May      Danisha Burgess RPH PharmD, BCACP 12/11/2024 12:55 PM    For Pharmacy Admin Tracking Only    Intervention Detail:   Total # of Interventions Recommended: 0  Total # of Interventions Accepted: 0  Time Spent (min): 10

## 2024-12-26 ENCOUNTER — ANTI-COAG VISIT (OUTPATIENT)
Age: 69
End: 2024-12-26

## 2024-12-26 DIAGNOSIS — I48.92 ATRIAL FLUTTER, UNSPECIFIED TYPE (HCC): Primary | ICD-10-CM

## 2024-12-26 LAB
INR BLD: 4.1
PROTIME: NORMAL

## 2024-12-26 NOTE — PROGRESS NOTES
[facility] Anticoagulation Clinic (Medication Management)  [address] [place], [address]  Phone: [phone]    Self-Test Encounter    Patient Findings       Positives:  Change in health (Mounjaro continues, he has lost 24 pounds)    Negatives:  Signs/symptoms of thrombosis, Signs/symptoms of bleeding, Laboratory test error suspected, Change in alcohol use, Change in activity, Upcoming invasive procedure, Emergency department visit, Upcoming dental procedure, Missed doses, Extra doses, Change in medications, Change in diet/appetite, Hospital admission, Bruising, Other complaints           Assessment/Plan:  Warfarin Indication:  atrial flutter      INR is supratherapeutic at 4.1, goal is 2-3. Patient continues to lose weight on Mounjaro (both losing weight and medication DDI can affect INR).    Warfarin Dosing Orders: Hold today then decrease weekly MD 8% - 5 mg every M/W/F; 10 mg all other days    Warfarin Dosing Orders Provided To: patient     Self-Test Follow Up (date/day of week): Wed, 1/8/25    POC Meter Contract Minimum Testing Frequency: 1x/month    Next  Clinic Scheduled Visit: due in May      Danisha Burgess RPH PharmD, BCACP 12/26/2024 12:44 PM    For Pharmacy Admin Tracking Only    Intervention Detail: Dose Adjustment: 1, reason: Therapy De-escalation  Total # of Interventions Recommended: 1  Total # of Interventions Accepted: 1  Time Spent (min): 10

## 2025-01-08 ENCOUNTER — ANTI-COAG VISIT (OUTPATIENT)
Age: 70
End: 2025-01-08

## 2025-01-08 VITALS — WEIGHT: 264 LBS | BODY MASS INDEX: 32.14 KG/M2

## 2025-01-08 DIAGNOSIS — I48.92 ATRIAL FLUTTER, UNSPECIFIED TYPE (HCC): Primary | ICD-10-CM

## 2025-01-08 LAB
INR BLD: 3
PROTIME: NORMAL

## 2025-01-08 NOTE — PROGRESS NOTES
[facility] Anticoagulation Clinic (Medication Management)  [address] [place], [address]  Phone: [phone]    Self-Test Encounter    Patient Findings       Positives:  Change in medications (Mounjaro continues, he has lost 24 pounds (at PCP office a few weeks ago, not weighing himself at home))    Negatives:  Signs/symptoms of thrombosis, Signs/symptoms of bleeding, Laboratory test error suspected, Change in health, Change in alcohol use, Change in activity, Upcoming invasive procedure, Emergency department visit, Upcoming dental procedure, Missed doses, Extra doses, Change in diet/appetite, Hospital admission, Bruising, Other complaints           Assessment/Plan:  Warfarin Indication:  atrial flutter      INR is therapeutic at 3, goal is 2-3.    Warfarin Dosing Orders: same (5 mg every M/W/F; 10 mg all other days)    Warfarin Dosing Orders Provided To: patient     Self-Test Follow Up (date/day of week): Wednesday in 2 weeks (1/22)    POC Meter Contract Minimum Testing Frequency: once a month    Next  Clinic Scheduled Visit: due in May      Danisha Burgess [facility] PharmD, BCACP 1/8/2025 11:17 AM    For Pharmacy Admin Tracking Only    Intervention Detail:   Total # of Interventions Recommended: 0  Total # of Interventions Accepted: 0  Time Spent (min): 10

## 2025-01-22 ENCOUNTER — ANTI-COAG VISIT (OUTPATIENT)
Age: 70
End: 2025-01-22

## 2025-01-22 DIAGNOSIS — I48.92 ATRIAL FLUTTER, UNSPECIFIED TYPE (HCC): Primary | ICD-10-CM

## 2025-01-22 LAB
INR BLD: 1.9
PROTIME: NORMAL

## 2025-01-22 NOTE — PROGRESS NOTES
[facility] Anticoagulation Clinic (Medication Management)  [address] [place], [address]  Phone: [phone]    Self-Test Encounter    Patient Findings       Negatives:  Signs/symptoms of thrombosis, Signs/symptoms of bleeding, Laboratory test error suspected, Change in health, Change in alcohol use, Change in activity, Upcoming invasive procedure, Emergency department visit, Upcoming dental procedure, Missed doses, Extra doses, Change in medications, Change in diet/appetite, Hospital admission, Bruising, Other complaints           Assessment/Plan:  Warfarin Indication:  atrial flutter       INR is Subtherapeutic at 1.9, goal is 2-3, Unknown cause     Warfarin Dosing Orders: Give booster dose of 5 mg for a total on 10 mg on 1/22/25, then resume 5 mg every Mon, Wed, Fri; 10 mg all other days     Warfarin Dosing Orders Provided To: pt    Self-Test Follow Up (date/day of week): 2/5/25, Wed    POC Meter Contract Minimum Testing Frequency: Once a month    Next AC Clinic Scheduled Visit: due in May      Electronically signed by Kallie De La Cruz on 1/22/2025 at 2:19 PM      For Pharmacy Admin Tracking Only    Intervention Detail: Dose Adjustment: 1, reason: Therapy Optimization  Total # of Interventions Recommended: 1  Total # of Interventions Accepted: 1  Time Spent (min): 10

## 2025-02-05 ENCOUNTER — ANTI-COAG VISIT (OUTPATIENT)
Age: 70
End: 2025-02-05

## 2025-02-05 DIAGNOSIS — I48.92 ATRIAL FLUTTER, UNSPECIFIED TYPE (HCC): Primary | ICD-10-CM

## 2025-02-05 LAB
INR BLD: 1.7
PROTIME: NORMAL

## 2025-02-05 NOTE — PROGRESS NOTES
[facility] Anticoagulation Clinic (Medication Management)  [address] [place], [address]  Phone: [phone]    Self-Test Encounter    Patient Findings       Negatives:  Signs/symptoms of thrombosis, Signs/symptoms of bleeding, Laboratory test error suspected, Change in health, Change in alcohol use, Change in activity, Upcoming invasive procedure, Emergency department visit, Upcoming dental procedure, Missed doses, Extra doses, Change in medications, Change in diet/appetite, Hospital admission, Bruising, Other complaints           Assessment/Plan:  Warfarin Indication:  atrial flutter        INR is subtherapeutic at 1.7, goal is 2-3, Unknown cause, last INR was 1.9.     Warfarin Dosing Orders: Increase weekly dose 9% - 5 mg every Mon, Fri; 10 mg all other days      Warfarin Dosing Orders Provided To: pt     Self-Test Follow Up (date/day of week): 2/19/25, Wed     POC Meter Contract Minimum Testing Frequency: Once a month     Next AC Clinic Scheduled Visit: due in May      Danisha Burgess [facility] PharmD, BCACP 2/5/2025 1:17 PM    For Pharmacy Admin Tracking Only    Intervention Detail: Dose Adjustment: 1, reason: Therapy Optimization  Total # of Interventions Recommended: 1  Total # of Interventions Accepted: 1  Time Spent (min): 10

## 2025-02-19 ENCOUNTER — ANTI-COAG VISIT (OUTPATIENT)
Age: 70
End: 2025-02-19

## 2025-02-19 DIAGNOSIS — I48.92 ATRIAL FLUTTER, UNSPECIFIED TYPE (HCC): Primary | ICD-10-CM

## 2025-02-19 LAB
INR BLD: 1.9
PROTIME: NORMAL

## 2025-02-19 NOTE — PROGRESS NOTES
[facility]ticoagulation [facility] (Medication Management)  [address] [place], [address]  Phone: [phone]    Self-Test Encounter    Patient Findings       Negatives:  Signs/symptoms of thrombosis, Signs/symptoms of bleeding, Laboratory test error suspected, Change in health, Change in alcohol use, Change in activity, Upcoming invasive procedure, Emergency department visit, Upcoming dental procedure, Missed doses, Extra doses, Change in medications, Change in diet/appetite, Hospital admission, Bruising, Other complaints           Assessment/Plan:  Warfarin Indication:  atrial flutter      INR is subtherapeutic at 1.9, goal is 2-3. There is no known/identifiable cause found for patient's out-of-range INR.    Warfarin Dosing Orders: 15 mg booster dose today then retry same regimen once more (5 mg Monday/Friday; 10 mg all other days)    Warfarin Dosing Orders Provided To: pt    Self-Test Follow Up (date/day of week): 3/5, Wed    POC Meter Contract Minimum Testing Frequency: once/MONTH    Next AC Clinic Scheduled Visit: due in May      Danisha Burgess [facility] PharmD, BCACP 2/19/2025 11:35 AM    For Pharmacy Admin Tracking Only    Intervention Detail: Dose Adjustment: 1, reason: Therapy Optimization  Total # of Interventions Recommended: 1  Total # of Interventions Accepted: 1  Time Spent (min): 10

## 2025-03-05 ENCOUNTER — ANTI-COAG VISIT (OUTPATIENT)
Age: 70
End: 2025-03-05

## 2025-03-05 DIAGNOSIS — I48.92 ATRIAL FLUTTER, UNSPECIFIED TYPE (HCC): Primary | ICD-10-CM

## 2025-03-05 LAB
INR BLD: 2.1
PROTIME: NORMAL

## 2025-03-05 NOTE — PROGRESS NOTES
[facility] Anticoagulation Clinic (Medication Management)  [address] [place], [address]  Phone: [phone]    Self-Test Encounter    Patient Findings       Positives:  Change in health (He saw PCP last week, A1C down to 7.1, continues on Mounjaro (lost 50 lbs so far))    Negatives:  Signs/symptoms of thrombosis, Signs/symptoms of bleeding, Laboratory test error suspected, Change in alcohol use, Change in activity, Upcoming invasive procedure, Emergency department visit, Upcoming dental procedure, Missed doses, Extra doses, Change in medications, Change in diet/appetite, Hospital admission, Bruising, Other complaints           Assessment/Plan:  Warfarin Indication:  atrial flutter      INR is therapeutic at 2.1, goal is 2.1    Warfarin Dosing Orders: same (5 mg every Monday/Friday; 10 mg all other days)    Warfarin Dosing Orders Provided To: pt    Self-Test Follow Up (date/day of week): 3 weeks on Wed 3/26    POC Meter Contract Minimum Testing Frequency: once/MONTH    Next AC Clinic Scheduled Visit: due in May      Danisha Burgess [facility] PharmD, BCACP 3/5/2025 4:15 PM    For Pharmacy Admin Tracking Only    Intervention Detail:   Total # of Interventions Recommended: 0  Total # of Interventions Accepted: 0  Time Spent (min): 15

## 2025-03-26 ENCOUNTER — ANTI-COAG VISIT (OUTPATIENT)
Age: 70
End: 2025-03-26

## 2025-03-26 DIAGNOSIS — I48.92 ATRIAL FLUTTER, UNSPECIFIED TYPE (HCC): Primary | ICD-10-CM

## 2025-03-26 LAB
INR BLD: 2.9
PROTIME: NORMAL

## 2025-03-26 NOTE — PROGRESS NOTES
[facility] Anticoagulation Clinic (Medication Management)  [address] [place], [address]  Phone: [phone]    Self-Test Encounter    Patient Findings       Negatives:  Signs/symptoms of thrombosis, Signs/symptoms of bleeding, Laboratory test error suspected, Change in health, Change in alcohol use, Change in activity, Upcoming invasive procedure, Emergency department visit, Upcoming dental procedure, Missed doses, Extra doses, Change in medications, Change in diet/appetite, Hospital admission, Bruising, Other complaints           Assessment/Plan:  Warfarin Indication:  atrial flutter       INR is therapeutic at 2.9, goal is 2-3.     Warfarin Dosing Orders: same (5 mg every Monday/Friday; 10 mg all other days)     Warfarin Dosing Orders Provided To: pt     Self-Test Follow Up (date/day of week): 4 weeks on Wed 4/23     POC Meter Contract Minimum Testing Frequency: once/MONTH     Next  Clinic Scheduled Visit: due in May      Danisha Burgess RPH PharmD, BCACP 3/26/2025 3:13 PM    For Pharmacy Admin Tracking Only    Intervention Detail:   Total # of Interventions Recommended: 0  Total # of Interventions Accepted: 0  Time Spent (min): 15

## 2025-04-23 ENCOUNTER — ANTI-COAG VISIT (OUTPATIENT)
Age: 70
End: 2025-04-23

## 2025-04-23 DIAGNOSIS — I48.92 ATRIAL FLUTTER, UNSPECIFIED TYPE (HCC): Primary | ICD-10-CM

## 2025-04-23 LAB
INTERNATIONAL NORMALIZATION RATIO, POC: 1.7
PROTHROMBIN TIME, POC: 0

## 2025-04-23 NOTE — PROGRESS NOTES
[facility] Anticoagulation Clinic (Medication Management)  [address] [place], [address]  Phone: [phone]    Self-Test Encounter    Patient Findings       Positives:  Change in health (Recently was sick in the beginning of March.)    Negatives:  Signs/symptoms of thrombosis, Signs/symptoms of bleeding, Laboratory test error suspected, Change in alcohol use, Change in activity, Upcoming invasive procedure, Emergency department visit, Upcoming dental procedure, Missed doses, Extra doses, Change in medications, Change in diet/appetite, Hospital admission, Bruising, Other complaints           Assessment/Plan:  Warfarin Indication:  atrial flutter       INR is subtherapeutic at 1.7, goal is 2-3. Unknown cause. Last INR was 2.90.     Warfarin Dosing Orders: Boost today with 15 mg then continue same (5 mg every Monday/Friday; 10 mg all other days)     Warfarin Dosing Orders Provided To: pt     Self-Test Follow Up (date/day of week): 2 weeks on Wednesday 5/7     POC Meter Contract Minimum Testing Frequency: once/MONTH     Next  Clinic Scheduled Visit: due in May      Electronically signed by Sheree Valverde RPH, PharmD, BCPS 4/23/2025 12:12 PM       For Pharmacy Admin Tracking Only    Intervention Detail:   Total # of Interventions Recommended: 1  Total # of Interventions Accepted: 1  Time Spent (min): 15

## 2025-05-07 ENCOUNTER — ANTI-COAG VISIT (OUTPATIENT)
Age: 70
End: 2025-05-07

## 2025-05-07 DIAGNOSIS — I48.92 ATRIAL FLUTTER, UNSPECIFIED TYPE (HCC): Primary | ICD-10-CM

## 2025-05-07 LAB
INR BLD: 2.2
PROTIME: NORMAL

## 2025-05-07 NOTE — PROGRESS NOTES
[facility] Anticoagulation Clinic (Medication Management)  [address] [place], [address]  Phone: [phone]    Self-Test Encounter    Patient Findings       Negatives:  Signs/symptoms of thrombosis, Signs/symptoms of bleeding, Laboratory test error suspected, Change in health, Change in alcohol use, Change in activity, Upcoming invasive procedure, Emergency department visit, Upcoming dental procedure, Missed doses, Extra doses, Change in medications, Change in diet/appetite, Hospital admission, Bruising, Other complaints           Assessment/Plan:  Warfarin Indication:  atrial flutter      INR is therapeutic at 2.2, goal is 2-3.    Warfarin Dosing Orders: same (5 mg every Mon/Fri; 10 mg all other days)    Warfarin Dosing Orders Provided To: patient (5 mg every Mon/Fri; 10 mg all other days)    Self-Test Follow Up (date/day of week): 5/28, Wed    POC Meter Contract Minimum Testing Frequency: once/MONTH    Next AC Clinic Scheduled Visit: 5/28/25      Danisha Burgess RPH PharmD, BCACP 5/7/2025 4:32 PM    For Pharmacy Admin Tracking Only    Intervention Detail:   Total # of Interventions Recommended: 0  Total # of Interventions Accepted: 0  Time Spent (min): 10

## 2025-05-28 ENCOUNTER — ANTI-COAG VISIT (OUTPATIENT)
Age: 70
End: 2025-05-28
Payer: MEDICARE

## 2025-05-28 VITALS
SYSTOLIC BLOOD PRESSURE: 155 MMHG | HEART RATE: 60 BPM | DIASTOLIC BLOOD PRESSURE: 87 MMHG | BODY MASS INDEX: 30.33 KG/M2 | WEIGHT: 249.2 LBS

## 2025-05-28 DIAGNOSIS — I48.92 ATRIAL FLUTTER, UNSPECIFIED TYPE (HCC): Primary | ICD-10-CM

## 2025-05-28 LAB
INTERNATIONAL NORMALIZATION RATIO, POC: 2.4
PROTHROMBIN TIME, POC: 0

## 2025-05-28 PROCEDURE — 99211 OFF/OP EST MAY X REQ PHY/QHP: CPT

## 2025-05-28 PROCEDURE — 85610 PROTHROMBIN TIME: CPT

## 2025-05-28 RX ORDER — ASCORBIC ACID 500 MG
500 TABLET ORAL DAILY
COMMUNITY

## 2025-05-28 NOTE — PROGRESS NOTES
[facility] Anticoagulation Clinic (Medication Management)  [address] [place], [address]  Phone: [phone]    Face-To-Face Visit  Patient Findings       Negatives:  Signs/symptoms of thrombosis, Signs/symptoms of bleeding, Laboratory test error suspected, Change in health, Change in alcohol use, Change in activity, Upcoming invasive procedure, Emergency department visit, Upcoming dental procedure, Missed doses, Extra doses, Change in medications, Change in diet/appetite, Hospital admission, Bruising, Other complaints           Assessment/Plan:  Warfarin indication: atrial flutter      INR today is therapeutic at 2.4, goal is 2-3. Patient's meter was 2.6 this morning.     Warfarin Dose: same (5 mg every Mon/Fri; 10 mg all other days)    Follow Up:  Recheck INR in 4 week(s).      Danisha Burgess [facility] PharmD, BCACP 5/28/2025 2:10 PM    For Pharmacy Admin Tracking Only    Intervention Detail:   Total # of Interventions Recommended: 0  Total # of Interventions Accepted: 0  Time Spent (min): 15

## 2025-05-30 ENCOUNTER — TELEPHONE (OUTPATIENT)
Dept: CARDIOLOGY CLINIC | Age: 70
End: 2025-05-30

## 2025-05-30 NOTE — TELEPHONE ENCOUNTER
Pt phnd to reschedule his 7/18/25 appt with Dr Britt - no available appts.  Please call pt to reschedule due to pt care [phone]

## 2025-06-25 ENCOUNTER — ANTI-COAG VISIT (OUTPATIENT)
Age: 70
End: 2025-06-25

## 2025-06-25 DIAGNOSIS — I48.92 ATRIAL FLUTTER, UNSPECIFIED TYPE (HCC): Primary | ICD-10-CM

## 2025-06-25 LAB
INR BLD: 2.4
PROTIME: NORMAL

## 2025-06-25 RX ORDER — WARFARIN SODIUM 10 MG/1
TABLET ORAL
Qty: 90 TABLET | Refills: 3 | Status: SHIPPED | OUTPATIENT
Start: 2025-06-25

## 2025-06-25 RX ORDER — WARFARIN SODIUM 10 MG/1
TABLET ORAL
Qty: 120 TABLET | Refills: 3 | OUTPATIENT
Start: 2025-06-25

## 2025-06-25 NOTE — PROGRESS NOTES
[facility] Anticoagulation Clinic (Medication Management)  [address] [place], [address]  Phone: [phone]    Self-Test Encounter       Assessment/Plan:  Warfarin Indication:  Atrial Flutter      INR is therapeutic at 2.4, goal is 2-3    Warfarin Dose: same (5 mg every Mon/Fri; 10 mg all other days)    Warfarin Dosing Orders Provided To: left     Self-Test Follow Up (date/day of week): 7/23, Wed    Meter Contract Minimum INR Testing Frequency: 1x/MONTH    Next AC Clinic Scheduled Visit (min.every 6 mos.): due in Nov      Danisha Burgess RPH PharmD, BCACP 6/25/2025 1:58 PM    For Pharmacy Admin Tracking Only    Intervention Detail:   Total # of Interventions Recommended: 0  Total # of Interventions Accepted: 0  Time Spent (min): 10

## 2025-06-26 ENCOUNTER — TELEPHONE (OUTPATIENT)
Age: 70
End: 2025-06-26

## 2025-06-26 NOTE — TELEPHONE ENCOUNTER
Patient called and left message on VM stating he is scheduled to check INR on 7/23/25. However, he will be out of town most of July. He would need to make an appt to reschedule for after 7/31/25. NILESH @ [phone]. OK to leave a message on his VM.    Electronically signed by Daylin Marx on 6/26/25 at 10:26 AM EDT

## 2025-06-26 NOTE — TELEPHONE ENCOUNTER
Called and spoke with patient. Cancelled INR check for 6/23/25 and rescheduled for Wednesday 8/6/25.    Electronically signed by Daylin Marx on 6/26/25 at 11:19 AM EDT

## 2025-07-07 DIAGNOSIS — I48.0 PAROXYSMAL ATRIAL FIBRILLATION (HCC): Primary | ICD-10-CM

## 2025-07-07 DIAGNOSIS — I48.92 ATRIAL FLUTTER, UNSPECIFIED TYPE (HCC): ICD-10-CM

## 2025-08-06 ENCOUNTER — ANTI-COAG VISIT (OUTPATIENT)
Age: 70
End: 2025-08-06

## 2025-08-06 DIAGNOSIS — I48.92 ATRIAL FLUTTER, UNSPECIFIED TYPE (HCC): Primary | ICD-10-CM

## 2025-08-06 LAB
INR BLD: 2.4
PROTIME: NORMAL

## 2025-08-21 ENCOUNTER — OFFICE VISIT (OUTPATIENT)
Dept: CARDIOLOGY CLINIC | Age: 70
End: 2025-08-21
Payer: MEDICARE

## 2025-08-21 VITALS
HEART RATE: 61 BPM | OXYGEN SATURATION: 94 % | RESPIRATION RATE: 18 BRPM | TEMPERATURE: 97.7 F | DIASTOLIC BLOOD PRESSURE: 68 MMHG | SYSTOLIC BLOOD PRESSURE: 132 MMHG | BODY MASS INDEX: 29.45 KG/M2 | HEIGHT: 76 IN | WEIGHT: 241.8 LBS

## 2025-08-21 DIAGNOSIS — I48.91 ATRIAL FIBRILLATION, UNSPECIFIED TYPE (HCC): Primary | ICD-10-CM

## 2025-08-21 PROCEDURE — 1123F ACP DISCUSS/DSCN MKR DOCD: CPT | Performed by: INTERNAL MEDICINE

## 2025-08-21 PROCEDURE — 99214 OFFICE O/P EST MOD 30 MIN: CPT | Performed by: INTERNAL MEDICINE

## 2025-08-21 PROCEDURE — 1036F TOBACCO NON-USER: CPT | Performed by: INTERNAL MEDICINE

## 2025-08-21 PROCEDURE — 3078F DIAST BP <80 MM HG: CPT | Performed by: INTERNAL MEDICINE

## 2025-08-21 PROCEDURE — 93000 ELECTROCARDIOGRAM COMPLETE: CPT | Performed by: INTERNAL MEDICINE

## 2025-08-21 PROCEDURE — G2211 COMPLEX E/M VISIT ADD ON: HCPCS | Performed by: INTERNAL MEDICINE

## 2025-08-21 PROCEDURE — G8427 DOCREV CUR MEDS BY ELIG CLIN: HCPCS | Performed by: INTERNAL MEDICINE

## 2025-08-21 PROCEDURE — G8417 CALC BMI ABV UP PARAM F/U: HCPCS | Performed by: INTERNAL MEDICINE

## 2025-08-21 PROCEDURE — 3017F COLORECTAL CA SCREEN DOC REV: CPT | Performed by: INTERNAL MEDICINE

## 2025-08-21 PROCEDURE — 1159F MED LIST DOCD IN RCRD: CPT | Performed by: INTERNAL MEDICINE

## 2025-08-21 PROCEDURE — 3075F SYST BP GE 130 - 139MM HG: CPT | Performed by: INTERNAL MEDICINE

## 2025-08-21 RX ORDER — PROPRANOLOL HCL 20 MG
20 TABLET ORAL 2 TIMES DAILY
Qty: 90 TABLET | Refills: 3
Start: 2025-08-21

## 2025-09-03 ENCOUNTER — ANTI-COAG VISIT (OUTPATIENT)
Age: 70
End: 2025-09-03

## 2025-09-03 DIAGNOSIS — I48.92 ATRIAL FLUTTER, UNSPECIFIED TYPE (HCC): Primary | ICD-10-CM

## 2025-09-03 LAB
INR BLD: 2.5
PROTIME: NORMAL